# Patient Record
Sex: FEMALE | Race: WHITE | NOT HISPANIC OR LATINO | Employment: OTHER | URBAN - METROPOLITAN AREA
[De-identification: names, ages, dates, MRNs, and addresses within clinical notes are randomized per-mention and may not be internally consistent; named-entity substitution may affect disease eponyms.]

---

## 2018-02-26 ENCOUNTER — RECORDS - HEALTHEAST (OUTPATIENT)
Dept: LAB | Facility: CLINIC | Age: 68
End: 2018-02-26

## 2018-02-28 LAB — BACTERIA SPEC CULT: NORMAL

## 2018-12-05 ENCOUNTER — RECORDS - HEALTHEAST (OUTPATIENT)
Dept: LAB | Facility: CLINIC | Age: 68
End: 2018-12-05

## 2018-12-05 LAB
CHOLEST SERPL-MCNC: 211 MG/DL
FASTING STATUS PATIENT QL REPORTED: ABNORMAL
HDLC SERPL-MCNC: 65 MG/DL
LDLC SERPL CALC-MCNC: 130 MG/DL
TRIGL SERPL-MCNC: 82 MG/DL
TSH SERPL DL<=0.005 MIU/L-ACNC: 1.74 UIU/ML (ref 0.3–5)

## 2018-12-06 LAB — 25(OH)D3 SERPL-MCNC: 28.3 NG/ML (ref 30–80)

## 2018-12-18 ASSESSMENT — MIFFLIN-ST. JEOR
SCORE: 1175.86
SCORE: 1175.86

## 2018-12-19 ENCOUNTER — ANESTHESIA - HEALTHEAST (OUTPATIENT)
Dept: SURGERY | Facility: AMBULATORY SURGERY CENTER | Age: 68
End: 2018-12-19

## 2018-12-20 ENCOUNTER — HOSPITAL ENCOUNTER (OUTPATIENT)
Dept: SURGERY | Facility: AMBULATORY SURGERY CENTER | Age: 68
Discharge: HOME | End: 2018-12-20
Attending: COLON & RECTAL SURGERY | Admitting: COLON & RECTAL SURGERY

## 2018-12-20 ENCOUNTER — SURGERY - HEALTHEAST (OUTPATIENT)
Dept: SURGERY | Facility: AMBULATORY SURGERY CENTER | Age: 68
End: 2018-12-20

## 2018-12-20 DIAGNOSIS — K62.89 MASS OF ANUS: ICD-10-CM

## 2018-12-20 ASSESSMENT — MIFFLIN-ST. JEOR
SCORE: 1175.86
SCORE: 1175.86

## 2019-03-06 ENCOUNTER — AMBULATORY - HEALTHEAST (OUTPATIENT)
Dept: OTHER | Facility: CLINIC | Age: 69
End: 2019-03-06

## 2019-10-07 ENCOUNTER — COMMUNICATION - HEALTHEAST (OUTPATIENT)
Dept: UROLOGY | Facility: CLINIC | Age: 69
End: 2019-10-07

## 2019-10-08 ENCOUNTER — OFFICE VISIT - HEALTHEAST (OUTPATIENT)
Dept: UROLOGY | Facility: CLINIC | Age: 69
End: 2019-10-08

## 2019-10-08 DIAGNOSIS — N20.1 CALCULUS OF URETER: ICD-10-CM

## 2019-10-08 DIAGNOSIS — N20.0 CALCULUS OF KIDNEY: ICD-10-CM

## 2019-10-11 ENCOUNTER — ANESTHESIA - HEALTHEAST (OUTPATIENT)
Dept: SURGERY | Facility: CLINIC | Age: 69
End: 2019-10-11

## 2019-10-11 ENCOUNTER — SURGERY - HEALTHEAST (OUTPATIENT)
Dept: SURGERY | Facility: CLINIC | Age: 69
End: 2019-10-11

## 2019-10-11 ASSESSMENT — MIFFLIN-ST. JEOR: SCORE: 1135.49

## 2019-10-17 ENCOUNTER — AMBULATORY - HEALTHEAST (OUTPATIENT)
Dept: UROLOGY | Facility: CLINIC | Age: 69
End: 2019-10-17

## 2019-10-17 DIAGNOSIS — N20.0 CALCULUS OF KIDNEY: ICD-10-CM

## 2019-10-17 DIAGNOSIS — N20.1 CALCULUS OF URETER: ICD-10-CM

## 2019-10-17 LAB
ALBUMIN UR-MCNC: ABNORMAL MG/DL
APPEARANCE UR: ABNORMAL
BILIRUB UR QL STRIP: NEGATIVE
COLOR UR AUTO: YELLOW
GLUCOSE UR STRIP-MCNC: NEGATIVE MG/DL
HGB UR QL STRIP: ABNORMAL
KETONES UR STRIP-MCNC: NEGATIVE MG/DL
LEUKOCYTE ESTERASE UR QL STRIP: ABNORMAL
NITRATE UR QL: NEGATIVE
PH UR STRIP: 5.5 [PH] (ref 5–8)
SP GR UR STRIP: >=1.03 (ref 1–1.03)
UROBILINOGEN UR STRIP-ACNC: ABNORMAL

## 2019-10-20 LAB — BACTERIA SPEC CULT: ABNORMAL

## 2019-11-19 ENCOUNTER — AMBULATORY - HEALTHEAST (OUTPATIENT)
Dept: LAB | Facility: CLINIC | Age: 69
End: 2019-11-19

## 2019-11-19 ENCOUNTER — OFFICE VISIT - HEALTHEAST (OUTPATIENT)
Dept: UROLOGY | Facility: CLINIC | Age: 69
End: 2019-11-19

## 2019-11-19 ENCOUNTER — HOSPITAL ENCOUNTER (OUTPATIENT)
Dept: CT IMAGING | Facility: CLINIC | Age: 69
Discharge: HOME | End: 2019-11-19
Attending: UROLOGY

## 2019-11-19 DIAGNOSIS — N20.1 CALCULUS OF URETER: ICD-10-CM

## 2019-11-19 DIAGNOSIS — N20.0 CALCULUS OF KIDNEY: ICD-10-CM

## 2019-11-19 LAB
ALBUMIN UR-MCNC: NEGATIVE MG/DL
APPEARANCE UR: ABNORMAL
BILIRUB UR QL STRIP: NEGATIVE
CALCIUM SERPL-MCNC: 10.3 MG/DL (ref 8.5–10.5)
CALCIUM, IONIZED MEASURED: 1.37 MMOL/L (ref 1.11–1.3)
COLOR UR AUTO: YELLOW
CREAT SERPL-MCNC: 0.71 MG/DL (ref 0.6–1.1)
GFR SERPL CREATININE-BSD FRML MDRD: >60 ML/MIN/1.73M2
GLUCOSE UR STRIP-MCNC: NEGATIVE MG/DL
HGB UR QL STRIP: ABNORMAL
ION CA PH 7.4: 1.35 MMOL/L (ref 1.11–1.3)
KETONES UR STRIP-MCNC: NEGATIVE MG/DL
LEUKOCYTE ESTERASE UR QL STRIP: ABNORMAL
NITRATE UR QL: NEGATIVE
PH UR STRIP: 6.5 [PH] (ref 5–8)
PH: 7.37 (ref 7.35–7.45)
PHOSPHATE SERPL-MCNC: 3.3 MG/DL (ref 2.5–4.5)
PTH-INTACT SERPL-MCNC: 185 PG/ML (ref 10–86)
SP GR UR STRIP: 1.02 (ref 1–1.03)
UROBILINOGEN UR STRIP-ACNC: ABNORMAL

## 2019-11-21 ENCOUNTER — AMBULATORY - HEALTHEAST (OUTPATIENT)
Dept: LAB | Facility: HOSPITAL | Age: 69
End: 2019-11-21

## 2019-11-21 DIAGNOSIS — N20.0 CALCULUS OF KIDNEY: ICD-10-CM

## 2019-11-21 LAB
BACTERIA SPEC CULT: ABNORMAL
MAGNESIUM 24H UR-MRATE: 104 MG/24 HR (ref 75–150)
MAGNESIUM UR-MCNC: 6.4 MG/DL
SPECIMEN VOL UR: 1625 ML

## 2019-11-22 LAB
CALCIUM 24H UR-MRATE: 358 MG/24HR (ref 20–275)
CHLORIDE 24H UR-SRATE: 133 MMOL/24HR (ref 110–250)
CITRATE 24H UR-MCNC: 613 MG/24HR
CREATININE, 24 HR URINE - HISTORICAL: 957.1 MG/24HR
OXALATE MG/SPEC: 33.8 MG/24HR (ref 7–44)
PH UR STRIP: 6 [PH] (ref 4.5–8)
PHOSPHORUS URINE MG/SPEC: 919.8 MG/24HR
POTASSIUM 24H UR-SCNC: 57 MMOL/24HR (ref 30–90)
SODIUM 24H UR-SRATE: 132 MMOL/24HR (ref 40–217)
SPECIMEN VOL UR: 1625 ML
URIC ACID URINE MG/SPEC: 575 MG/24HR (ref 250–750)

## 2019-11-29 ENCOUNTER — AMBULATORY - HEALTHEAST (OUTPATIENT)
Dept: LAB | Facility: HOSPITAL | Age: 69
End: 2019-11-29

## 2019-11-29 DIAGNOSIS — N20.0 CALCULUS OF KIDNEY: ICD-10-CM

## 2019-11-29 LAB
CALCIUM 24H UR-MRATE: 314 MG/24HR (ref 20–275)
CHLORIDE 24H UR-SRATE: 135 MMOL/24HR (ref 110–250)
CITRATE 24H UR-MCNC: 622 MG/24HR
CREATININE, 24 HR URINE - HISTORICAL: 930 MG/24HR
MAGNESIUM 24H UR-MRATE: 101 MG/24 HR (ref 75–150)
MAGNESIUM UR-MCNC: 6.7 MG/DL
OXALATE MG/SPEC: 33.8 MG/24HR (ref 7–44)
PH UR STRIP: 6.5 [PH] (ref 4.5–8)
PHOSPHORUS URINE MG/SPEC: 907.5 MG/24HR
POTASSIUM 24H UR-SCNC: 47 MMOL/24HR (ref 30–90)
SODIUM 24H UR-SRATE: 153 MMOL/24HR (ref 40–217)
SPECIMEN VOL UR: 1500 ML
SPECIMEN VOL UR: 1500 ML
URIC ACID URINE MG/SPEC: 473 MG/24HR (ref 250–750)

## 2019-12-05 ENCOUNTER — COMMUNICATION - HEALTHEAST (OUTPATIENT)
Dept: UROLOGY | Facility: CLINIC | Age: 69
End: 2019-12-05

## 2019-12-13 ENCOUNTER — RECORDS - HEALTHEAST (OUTPATIENT)
Dept: LAB | Facility: CLINIC | Age: 69
End: 2019-12-13

## 2019-12-13 LAB — TSH SERPL DL<=0.005 MIU/L-ACNC: 1.71 UIU/ML (ref 0.3–5)

## 2019-12-16 ENCOUNTER — COMMUNICATION - HEALTHEAST (OUTPATIENT)
Dept: UROLOGY | Facility: CLINIC | Age: 69
End: 2019-12-16

## 2019-12-16 ENCOUNTER — OFFICE VISIT - HEALTHEAST (OUTPATIENT)
Dept: UROLOGY | Facility: CLINIC | Age: 69
End: 2019-12-16

## 2019-12-16 DIAGNOSIS — N20.0 CALCULUS OF KIDNEY: ICD-10-CM

## 2019-12-16 LAB — 25(OH)D3 SERPL-MCNC: 20.8 NG/ML (ref 30–80)

## 2020-06-05 ENCOUNTER — RECORDS - HEALTHEAST (OUTPATIENT)
Dept: UROLOGY | Facility: CLINIC | Age: 70
End: 2020-06-05

## 2020-06-05 ENCOUNTER — AMBULATORY - HEALTHEAST (OUTPATIENT)
Dept: UROLOGY | Facility: CLINIC | Age: 70
End: 2020-06-05

## 2020-06-05 DIAGNOSIS — N20.0 CALCULUS OF KIDNEY: ICD-10-CM

## 2020-06-22 ENCOUNTER — RECORDS - HEALTHEAST (OUTPATIENT)
Dept: RADIOLOGY | Facility: CLINIC | Age: 70
End: 2020-06-22

## 2020-06-22 ENCOUNTER — OFFICE VISIT - HEALTHEAST (OUTPATIENT)
Dept: UROLOGY | Facility: CLINIC | Age: 70
End: 2020-06-22

## 2020-06-22 DIAGNOSIS — N20.0 CALCULUS OF KIDNEY: ICD-10-CM

## 2020-06-22 RX ORDER — AZELASTINE 1 MG/ML
1 SPRAY, METERED NASAL DAILY PRN
Status: SHIPPED | COMMUNITY
Start: 2019-11-18

## 2021-01-08 ENCOUNTER — RECORDS - HEALTHEAST (OUTPATIENT)
Dept: LAB | Facility: CLINIC | Age: 71
End: 2021-01-08

## 2021-01-11 LAB — 25(OH)D3 SERPL-MCNC: 37 NG/ML (ref 30–80)

## 2021-02-17 ENCOUNTER — AMBULATORY - HEALTHEAST (OUTPATIENT)
Dept: NURSING | Facility: CLINIC | Age: 71
End: 2021-02-17

## 2021-03-10 ENCOUNTER — AMBULATORY - HEALTHEAST (OUTPATIENT)
Dept: NURSING | Facility: CLINIC | Age: 71
End: 2021-03-10

## 2021-05-26 VITALS — SYSTOLIC BLOOD PRESSURE: 138 MMHG | TEMPERATURE: 97.7 F | HEART RATE: 71 BPM | DIASTOLIC BLOOD PRESSURE: 65 MMHG

## 2021-05-26 VITALS — DIASTOLIC BLOOD PRESSURE: 65 MMHG | HEART RATE: 65 BPM | TEMPERATURE: 97.5 F | SYSTOLIC BLOOD PRESSURE: 138 MMHG

## 2021-05-26 VITALS — HEART RATE: 79 BPM | DIASTOLIC BLOOD PRESSURE: 65 MMHG | SYSTOLIC BLOOD PRESSURE: 149 MMHG | TEMPERATURE: 97.5 F

## 2021-05-26 VITALS — SYSTOLIC BLOOD PRESSURE: 143 MMHG | DIASTOLIC BLOOD PRESSURE: 67 MMHG | HEART RATE: 91 BPM | TEMPERATURE: 98.4 F

## 2021-06-02 VITALS
WEIGHT: 142 LBS | HEIGHT: 66 IN | HEIGHT: 66 IN | WEIGHT: 142 LBS | BODY MASS INDEX: 22.82 KG/M2 | BODY MASS INDEX: 22.82 KG/M2

## 2021-06-02 NOTE — PROGRESS NOTES
Assessment/Plan:        Diagnoses and all orders for this visit:    Calculus of ureter  -     Urinalysis Macroscopic  -     Culture, Urine- Future; Future; Expected date: 11/16/2019  -     Culture, Urine- Future  -     ciprofloxacin HCl tablet 250 mg (CIPRO)  -     Cystoscopy with Stent Removal Education  -     Patient Stated Goal: Prevent further stones  -     CT Abdomen Pelvis Without Oral Without IV Contrast; Future; Expected date: 11/16/2019    Calculus of kidney  -     CT Abdomen Pelvis Without Oral Without IV Contrast; Future; Expected date: 11/16/2019    Other orders  -     acetaminophen (TYLENOL) 500 MG tablet; Take 500 mg by mouth every 6 (six) hours as needed for pain.  -     ibuprofen (ADVIL) 200 MG tablet; Take 200 mg by mouth every 6 (six) hours as needed for pain.  -     HEMP OIL OR EXTRACT OR OTHER CBD CANNABINOID, NOT MEDICAL CANNABIS,  -     ciprofloxacin HCl (CIPRO) 250 MG tablet      Stone Management Plan  [facility] Stone Management 10/8/2019 10/17/2019   Urinary Tract Infection No suspicion of infection No suspicion of infection   Renal Colic Well controlled symptoms Well controlled symptoms   Renal Failure No suspicion of renal failure No suspicion of renal failure   Current CT date 10/6/2019 -   Right sided stones? Yes -   R Number of ureteral stones 1 -   R GSD of ureteral stones 7 -   R Location of ureteral stone Proximal -   R Number of kidney stones  No renal stones -   R GSD of kidney stones N/A -   R Hydronephrosis Mild -   R Stone Event New event Established event   Diagnosis date 10/6/2019 -   Initial location of primary symptomatic stone Proximal -   Initial GSD of primary symptomatic stone 7 -   R Post-op status - Stent Removal   R Current Plan Clear -   Clear rationale Patient preference -   Left sided stones? Yes -   L Number of ureteral stones No ureteral stones -   L Number of kidney stones  1 -   L GSD of kidney stones 15 - 20 -   L Hydronephrosis None -   L Stone Event No current  event No current event   L Current Plan Observe -   Observe rationale Significant stone burden amenable to emergency management -             Subjective:      HPI  Ms. Rosmery Heard is a 69 y.o.  female returning to the [facility] for early postoperative follow up for anticipated stent removal.     She returns status post right ureteroscopic extraction for proximal ureteral stone. She has had no unanticipated post-operative events.    She has had no symptoms suspicious for infection and stent was moderately symptomatic with typical issues of flank discomfort and lower urinary tract irritation.     Flexible cystoscopy is performed and indwelling stent is removed without incident.    She will follow up in the office in one month with imaging.       ROS   Review of systems is negative except for HPI.    Past Medical History:   Diagnosis Date     Cancer (H)     anal     Kidney stone        Past Surgical History:   Procedure Laterality Date     CYSTOSCOPY W/ URETERAL STENT PLACEMENT Right 10/11/2019    Procedure: RIGHT CYSTOURETEROSCOPY WITH BASKET STONE EXTRACTION  AND URETERAL STENT INSERTION;  Surgeon: Aayush Rider MD;  Location: [facility];  Service: Urology     DILATION AND CURETTAGE OF UTERUS       VULVA / PERINEUM BIOPSY N/A 12/20/2018    Procedure: EUA BIOPSY ANAL MASS;  Surgeon: Blaire Ashby MD;  Location: [facility];  Service: General       Current Outpatient Medications   Medication Sig Dispense Refill     acetaminophen (TYLENOL) 500 MG tablet Take 500 mg by mouth every 6 (six) hours as needed for pain.       HEMP OIL OR EXTRACT OR OTHER CBD CANNABINOID, NOT MEDICAL CANNABIS,        ibuprofen (ADVIL) 200 MG tablet Take 200 mg by mouth every 6 (six) hours as needed for pain.       Current Facility-Administered Medications   Medication Dose Route Frequency Provider Last Rate Last Dose     ciprofloxacin HCl (CIPRO) 250 MG tablet                Allergies    Allergen Reactions     Other Environmental Allergy      Seasonal allergies       Social History     Socioeconomic History     Marital status:      Spouse name: Not on file     Number of children: Not on file     Years of education: Not on file     Highest education level: Not on file   Occupational History     Occupation: Works Independently   Social Needs     Financial resource strain: Not on file     Food insecurity:     Worry: Not on file     Inability: Not on file     Transportation needs:     Medical: Not on file     Non-medical: Not on file   Tobacco Use     Smoking status: Never Smoker     Smokeless tobacco: Never Used   Substance and Sexual Activity     Alcohol use: Yes     Frequency: Never     Comment: very rare     Drug use: No     Sexual activity: Not on file   Lifestyle     Physical activity:     Days per week: Not on file     Minutes per session: Not on file     Stress: Not on file   Relationships     Social connections:     Talks on phone: Not on file     Gets together: Not on file     Attends Tenri[employer] service: Not on file     Active member of club or organization: Not on file     Attends meetings of clubs or organizations: Not on file     Relationship status: Not on file     Intimate partner violence:     Fear of current or ex partner: Not on file     Emotionally abused: Not on file     Physically abused: Not on file     Forced sexual activity: Not on file   Other Topics Concern     Not on file   Social History Narrative     Not on file       Family History   Problem Relation Age of Onset     Urolithiasis Father      Heart disease Father         mi     Diabetes Sister      Urolithiasis Cousin      Cancer Neg Hx      Gout Neg Hx      Clotting disorder Neg Hx      Objective:      Physical Exam  Vitals:    10/17/19 1307   BP: 143/67   Pulse: 91   Temp: 98.4  F (36.9  C)     General - well developed, well nourished, appropriate for age. Appears no distress at this time  Abdomen - mildly obese  soft, non-tender, no hepatosplenomegaly, no masses.   - no flank tenderness, no suprapubic tenderness, kidney and bladder non-palpable  MSK - normal spinal curvature. no spinal tenderness. normal gait. muscular strength intact.  Psych - oriented to time, place, and person, normal mood and affect.      Labs   Urinalysis POC (Office):  Nitrite, UA   Date Value Ref Range Status   10/17/2019 Negative Negative Final   10/06/2019 Negative Negative Final   02/23/2019 Negative Negative Final       Lab Urinalysis:  Blood, UA   Date Value Ref Range Status   10/17/2019 Large (!) Negative Final   10/06/2019 Large (!) Negative Final   02/23/2019 Small (!) Negative Final     Nitrite, UA   Date Value Ref Range Status   10/17/2019 Negative Negative Final   10/06/2019 Negative Negative Final   02/23/2019 Negative Negative Final     Leukocytes, UA   Date Value Ref Range Status   10/17/2019 Small (!) Negative Final   10/06/2019 Small (!) Negative Final   02/23/2019 Moderate (!) Negative Final     pH, UA   Date Value Ref Range Status   10/17/2019 5.5 5.0 - 8.0 Final   10/06/2019 5.5 4.5 - 8.0 Final   02/23/2019 7.5 4.5 - 8.0 Final

## 2021-06-02 NOTE — ANESTHESIA CARE TRANSFER NOTE
Last vitals:   Vitals:    10/11/19 1109   BP: 113/55   Pulse: 62   Resp: 14   Temp: 37.1  C (98.7  F)   SpO2: 100%     Patient's level of consciousness is drowsy  Spontaneous respirations: yes  Maintains airway independently: yes  Dentition unchanged: yes  Oropharynx: oropharynx clear of all foreign objects    QCDR Measures:  ASA# 20 - Surgical Safety Checklist: WHO surgical safety checklist completed prior to induction    PQRS# 430 - Adult PONV Prevention: 4558F - Pt received => 2 anti-emetic agents (different classes) preop & intraop  ASA# 8 - Peds PONV Prevention: NA - Not pediatric patient, not GA or 2 or more risk factors NOT present  PQRS# 424 - [place]-op Temp Management: 4559F - At least one body temp DOCUMENTED => 35.5C or 95.9F within required timeframe  PQRS# 426 - PACU Transfer Protocol: - Transfer of care checklist used  ASA# 14 - Acute Post-op Pain: ASA14B - Patient did NOT experience pain >= 7 out of 10

## 2021-06-02 NOTE — TELEPHONE ENCOUNTER
Message left for patient to call clinic to set up an appointment for kidney stone follow-up.  Melly Mistry RN

## 2021-06-02 NOTE — PATIENT INSTRUCTIONS - HE
Patient Stated Goal: Know what to expect after surgery  Ureteroscopy    Ureteroscopy is a procedure which is done for clearance of stones from the ureter, kidney or both. There are no incisions involved. The procedure involves your surgeon placing a small scope into your urethra. This is the opening where urine leaves your body.  The surgeon watches as they carefully guide the scope to the stone(s).  Modern flexible ureteroscopes can be used to reach virtually any location within the urinary tract.     The size, shape and location of the stone determines how best to treat the stone(s).  Whenever possible, stones are removed in one piece.  Larger stones need to be broken using a laser before removing in smaller pieces.  The goal is to remove all stones and stone fragments from that side of the body in a single treatment.  Complete stone clearance is an important step to prevent future kidney stone episodes.    Surgery:    Same day outpatient procedure    30-60 minutes    Procedure done in hospital surgical suite    General anesthesia (you will be asleep during the procedure)     Antibiotic prior to surgery to prevent infection    Physician will visit with you and respond to any questions or concerns and consent will be signed prior to going to the operating room    Risks:    Infection - Preoperative antibiotics should prevent new infections but it is possible that unanticipated bacteria may be introduced at time of surgery or that the stones were actually chronically infected before surgery      Injury - The ureter may be injured during this procedure.  This is most likely to happen if the ureter was very inflamed before surgery or if a stone is very tightly impacted.  The surgeon will not aggressively treat a stone if this creates a risk of injury.        Inaccessible Stones -A single procedure is effective in 95% of cases, but if your ureter is very narrow or your kidney stone is very impacted, a stent will be  placed and the procedure stopped.  In 1-2 weeks after the ureter has relaxed, the patient will be brought back to surgery and the procedure can be safely performed.      Incomplete stone clearance -Occasionally stone or stone fragments may not be completely cleared.  These may pass on their own, which may cause discomfort.  Our goal is to remove all possible stones and fragments.    Stent:      An internal soft tube will be placed between the kidney and the bladder while in surgery (after the stone is cleared). The stent will keep the kidney draining.    What should I expect?     It is common for a stent to cause some irritation and discomfort.   You may have:      The need to urinate suddenly     The need to urinate often     Pain during urination     A dull backache, which may get worse during urination     Blood stained urine (like fruit punch) and occasional small clots    It s important to remember the stent is necessary and only temporary. To feel more comfortable:      Drink more than you normally would but you do not have to constantly  flush your kidneys     Limiting your activity may decrease irritation or bleeding    Ibuprofen - 2 tablets every 6-8 hours     Use pain medications as directed.    When is the stent removed?    Most stents are removed within 5 days to 2 weeks after a procedure.     How is the stent removed?     Your stent will be removed in the [facility] with a small telescope and a grasping tool.  It usually takes less than 1 minute to remove the stent.    What should I expect after the stent is removed?     You should feel normal by the next day    Some patients find:    An increase in back pain about an hour after the stent is removed as the kidney fills up with urine before it starts to empty.  It can be as uncomfortable as your initial stone episode.  Taking pain medications before stent removal may be helpful, but you would need someone else to drive you to and from your  appointment.    Bladder symptoms usually disappear by the next morning.    Small amounts of blood in the urine may be seen occasionally for up to a week.    Diet:      After surgery, there are no dietary restrictions - Drink to thirst, there is no need to increase intake of fluids, as this may increase nausea symptoms. Try to eat smaller, more frequent snacks, instead of large meals.    Activity:    Many people return to work within 1-2 days. Fatigue is normal for a couple of weeks following surgery. With increased activity you may experience more discomfort and you may notice more blood in your urine.      Post-Operative Symptom Control    While you recover from your procedure, you can take steps to ease your recovery.    Medications that prevent further episodes of severe pain and help stones pass: Take these as prescribed on a regular basis even if you are NOT in pain      Ibuprofen (Advil or Motrin) - Is available over the counter Take 2 (200mg) tablets every 6 hours until the stone passes.  o prevents spasm of the ureter.    o Decreases pain      Dramamine - (drowsy version, non-generic formulation) Is available over the counter and decreases spasm of the ureter.  Take 50mg at bedtime every night until the stone passes. In addition, take every 6 hours as needed.  Dramamine:  o Decreases nausea  o Decreases acute pain  o Decreases recurrence of pain for next 24 hours  o Will help you sleep        *This medication will cause increased drowsiness, do not drive or operate machinery for 6 hours      Flomax- Studies show that Flomax decreases irritation from stents.   o Take every day with food until stone passes even if you do not have pain  o Flomax does not relieve pain.        *This medication may cause nasal congestion or light-headedness      Detrol ( Tolterodine) - After surgery Detrol may decrease stent irritation and pelvic pain  o Take as prescribed     *This medication may cause dry mouth, constipation or  blurry vision. Stop medication if unable to urinate.    Medication that are taken as needed to manage break through symptoms: Take these ONLY as required and hopefully not at all      Narcotics (Percocet, Vicodin, Dilaudid)- take as prescribed for severe pain unrelieved by ibuprofen and dramamine  o Take as prescribed for severe pain  o Narcotics have significant side effects and only  cover-up  pain. They have no effect on cause of pain.  o Common side effects:  - Confusion, disorientation and sedation - DO NOT DRIVE OR OPERATE MACHINERY WITHIN 24 HOURS  - Nausea - take Dramamine or Zofran  or Haldol to help control  - Constipation  - Sleep disturbances      Ondansetron (Zofran)-  o Take as prescribed  o Reserve for severe nausea  o May cause constipation, start over the counter Miralax if needed to treat this    Haldol-  o Take as prescribed  o Reserve for severe nausea    Warning Signs/Symptoms - Please call the [facility] 24 hours a day at [phone] IMMEDIATELY if you experience any of these:    Fever greater than 100.1     Chills    Pain NOT CONTROLLED by pain medications    Heavy bleeding or large clots in urine (small clots can be normal)    Persistent nausea and/or vomiting    Post-Operative Follow up:    The stone(s) will be sent from surgery to a lab for composition analysis.  These results are usually available before a one month post-operative visit.  If you had laser treatment to break up your stone, you will usually be scheduled for a low dose CT scan prior to your one month appointment.  This scan allows your surgeon to confirm that all stone fragments were cleared at time of surgery and that there have been no complications.  These results along with possible labs and urine studies will help us develop an individualized plan to prevent new stones from forming and keep existing stones from enlarging.  This visit is usually scheduled about 1 month after your original surgery.    The  [facility] can respond to your questions or concerns 24 hours a day at [phone].

## 2021-06-02 NOTE — PATIENT INSTRUCTIONS - HE
Patient Stated Goal: Prevent further stones  Cystoscopy with Stent Removal    Cystoscopy is used to help diagnose urinary problems, or to remove a ureteral stent.    During a cystoscopy, your doctor examines the inside of your bladder with an instrument called a cystoscope. A cystoscope is a long, thin flexible tube with a camera at the end.    Your doctor will insert the scope into your urethra allowing him to visualize and evaluate the inside of the bladder for possible abnormalities. The urethra is the tube that carries urine to the outside of your body.    How is the stent removed?    Your stent will be removed in the [facility] with a small telescope and a grasping tool.  It usually takes less than 1 minute to remove the stent.    What should I expect after the stent is removed?     You should feel normal by the next day.    Some patients find:      An increase in back pain about an hour after the stent is removed as the kidney fills up with urine before it starts to empty.  It can be as uncomfortable as your initial stone episode.  Taking pain medications before stent removal may be helpful, but you would need someone else to drive you to and from your appointment.    Bladder symptoms usually disappear by the next morning.    Small amounts of blood in the urine may be seen occasionally for up to a week.    At Home:      It is important to drink plenty of fluids after your procedure    You may continue to use your pain medications as prescribed    What symptoms should I watch for?    Fever     Chills    Increasing back pain that is not relieved with pain medications    Large amounts of blood in the urine or large clots    Leakage of urine (incontinence)     Are not able to urinate for 8 hours    These symptoms may mean you have a blockage or infection. Call the [facility] 24 hours a day at [phone] immediately.

## 2021-06-02 NOTE — PROGRESS NOTES
Assessment/Plan:        Diagnoses and all orders for this visit:    Calculus of ureter  -     Urinalysis Macroscopic  -     tamsulosin (FLOMAX) 0.4 mg cap; Take 1 capsule (0.4 mg total) by mouth daily for 14 days.  Dispense: 14 capsule; Refill: 1  -     Patient Stated Goal: Know what to expect after surgery  -     Ureteroscopy Education  -     Verify informed consent; Standing  -     Diet NPO; Standing  -     Place sequential compression device; Standing  -     XR Retrograde Pyelogram W or WO KUB Intraoperative; Standing  -     levoFLOXacin 500 mg/100 mL IVPB 500 mg (LEVAQUIN)  -     ketorolac injection 15 mg (TORADOL)  -     acetaminophen tablet 1,000 mg (TYLENOL)  -     sterile water IR irrigation solution 3,000 mL  -     Case Request: RIGHT CYSTOURETEROSCOPY WITH LITHOTRIPSY USING HOLMIUM LASER AND URETERAL STENT INSERTION; Standing  -     Case Request: RIGHT CYSTOURETEROSCOPY WITH LITHOTRIPSY USING HOLMIUM LASER AND URETERAL STENT INSERTION    Calculus of kidney      Stone Management Plan  [facility] 10/8/2019   Urinary Tract Infection No suspicion of infection   Renal Colic Well controlled symptoms   Renal Failure No suspicion of renal failure   Current CT date 10/6/2019   Right sided stones? Yes   R Number of ureteral stones 1   R GSD of ureteral stones 7   R Location of ureteral stone Proximal   R Number of kidney stones  No renal stones   R GSD of kidney stones N/A   R Hydronephrosis Mild   R Stone Event New event   Diagnosis date 10/6/2019   Initial location of primary symptomatic stone Proximal   Initial GSD of primary symptomatic stone 7   R Current Plan Clear   Clear rationale Patient preference   Left sided stones? Yes   L Number of ureteral stones No ureteral stones   L Number of kidney stones  1   L GSD of kidney stones 15 - 20   L Hydronephrosis None   L Stone Event No current event   L Current Plan Observe   Observe rationale Significant stone burden amenable to emergency management                Subjective:      HPI  Ms. Rosmery Heard is a 69 y.o.  female presenting to the [facility] [place] following [facility]'s ER visit for urolithiasis.    She is a first time unidentified composition stone former who has not required stone clearance procedures. She has not previously participated in stone risk evaluation. She has no identified modifiable stone risk factors. She has no identified non-modifiable stone risk factors.    She was seen in ER 10/6/19 for acute onset right flank pain x 3 days. The pain was sharp and fluctuant without radiation. She has hx of kidney stones in remote past with similar pain. She took advil with some relief. She had associated nausea. Workup was notable for CT reporting an obstructing right ureteral stone and conglomeration of left renal stones. UA was not suspicious for infection. She was sent home with zofran and oxycodone.       Significant current symptoms include:  Mild right flank pain. Pertinent negative current symptoms include:  fever, chills, left flank pain, nausea, vomiting, hematuria, urinary frequency and dysuria. Of note, she has hx of anal cancer s/p chemo/radiation this last Winter. She had a single UTI earlier this year.    CT scan from 10/6/19 is personally reviewed and demonstrates a mildly obstructing 6 x 3 mm right proximal ureteral stone. There is a branching stone within left lower pole measuring 17 mm.    Significant labs from presentation include severe hematuria, mild pyuria, negative nitrite, no bacteria and no growth on urine culture.    PLAN    70 yo F with hx of stone disease, no previous surgical interventions. Obstructing right ureteral stone, pain currently controlled. Dominant, nonobstructing left renal stone.    Rosmery would prefer to have the right stone surgical managed given persistent pain. Will proceed with ureterscopic stone clearance this week. Risks and benefits were detailed of ureteroscopic stone  clearance including potential issues of urinary or systemic infection, ureteral injury, inaccessible stone, incomplete stone clearance, multiple surgeries, and stent related symptoms of urgency, frequency and hematuria Patient verbalized understanding. Patient agrees with plan as discussed. Preoperative evaluation with primary care has not been requested because of urgency of situation.    For symptom control, she was prescribed oxycodone, ondansetron and Flomax. Over the counter symptom control medications of ibuprofen, Dramamine and tylenol were recommended.    Patient also seen and examined by LINA Murray   Review of Systems  A 12 point comprehensive review of systems is negative except for HPI    Past Medical History:   Diagnosis Date     Cancer (H)     anal     Kidney stone        Past Surgical History:   Procedure Laterality Date     DILATION AND CURETTAGE OF UTERUS       VULVA / PERINEUM BIOPSY N/A 12/20/2018    Procedure: EUA BIOPSY ANAL MASS;  Surgeon: Blaire Ashby MD;  Location: [facility];  Service: General       Current Outpatient Medications   Medication Sig Dispense Refill     acetaminophen (TYLENOL) 325 MG tablet Take 2 tablets (650 mg total) by mouth every 4 (four) hours as needed.  0     acetaminophen (TYLENOL) 500 MG tablet Take 2 tablets (1,000 mg total) by mouth 4 (four) times a day for 7 days. 56 tablet 0     dimenhyDRINATE (DRAMAMINE) 50 MG tablet Take 1 tablet (50 mg total) by mouth at bedtime for 7 days. 7 tablet 0     dimenhyDRINATE (DRAMAMINE) 50 MG tablet Take 1 tablet (50 mg total) by mouth 4 (four) times a day as needed. 28 tablet 0     ibuprofen (ADVIL,MOTRIN) 200 MG tablet Take 2 tablets (400 mg total) by mouth 4 (four) times a day for 7 days. 56 tablet 0     oxyCODONE (ROXICODONE) 5 MG immediate release tablet Every 4-6 hours as needed if pain is not improved with acetaminophen and ibuprofen. 8 tablet 0     LORazepam (ATIVAN) 0.5 MG tablet Take 0.5 mg by mouth  every 6 (six) hours as needed (nausea).       ondansetron (ZOFRAN ODT) 4 MG disintegrating tablet Take 1 tablet (4 mg total) by mouth every 8 (eight) hours as needed for nausea. 12 tablet 0     tamsulosin (FLOMAX) 0.4 mg cap Take 1 capsule (0.4 mg total) by mouth daily for 14 days. 14 capsule 1     No current facility-administered medications for this visit.        No Known Allergies    Social History     Socioeconomic History     Marital status:      Spouse name: Not on file     Number of children: Not on file     Years of education: Not on file     Highest education level: Not on file   Occupational History     Occupation: Works Independently   Social Needs     Financial resource strain: Not on file     Food insecurity:     Worry: Not on file     Inability: Not on file     Transportation needs:     Medical: Not on file     Non-medical: Not on file   Tobacco Use     Smoking status: Never Smoker     Smokeless tobacco: Never Used   Substance and Sexual Activity     Alcohol use: Not Currently     Frequency: Never     Drug use: No     Sexual activity: Not on file   Lifestyle     Physical activity:     Days per week: Not on file     Minutes per session: Not on file     Stress: Not on file   Relationships     Social connections:     Talks on phone: Not on file     Gets together: Not on file     Attends [employer] service: Not on file     Active member of club or organization: Not on file     Attends meetings of clubs or organizations: Not on file     Relationship status: Not on file     Intimate partner violence:     Fear of current or ex partner: Not on file     Emotionally abused: Not on file     Physically abused: Not on file     Forced sexual activity: Not on file   Other Topics Concern     Not on file   Social History Narrative     Not on file       Family History   Problem Relation Age of Onset     Urolithiasis Father      Heart disease Father         mi     Diabetes Sister      Urolithiasis Cousin       Cancer Neg Hx      Gout Neg Hx      Clotting disorder Neg Hx        Objective:      Physical Exam  Vitals:    10/08/19 0804   BP: 138/65   Pulse: 65   Temp: 97.5  F (36.4  C)     General - well developed, well nourished, appropriate for age. Appears in no acute distress   Heart - regular rate and rhythm, no murmur  Respiratory - normal effort, clear to auscultation, good air entry without adventitious noises  Abdomen - slender soft, non-tender, no hepatosplenomegaly, no masses.   - right flank mild tenderness, no suprapubic tenderness, kidney and bladder non-palpable  MSK - normal spinal curvature. no spinal tenderness. normal gait. muscular strength intact.  Neurology - cranial nerves II-XII grossly intact, normal sensation, no unsteadiness  Skin - intact, no bruising, no gouty tophi  Psych - oriented to time, place, and person, normal mood and affect.    Labs  Urinalysis POC (Office):  Nitrite, UA   Date Value Ref Range Status   10/06/2019 Negative Negative Final   02/23/2019 Negative Negative Final       Lab Urinalysis:  Blood, UA   Date Value Ref Range Status   10/06/2019 Large (!) Negative Final   02/23/2019 Small (!) Negative Final     Nitrite, UA   Date Value Ref Range Status   10/06/2019 Negative Negative Final   02/23/2019 Negative Negative Final     Leukocytes, UA   Date Value Ref Range Status   10/06/2019 Small (!) Negative Final   02/23/2019 Moderate (!) Negative Final     pH, UA   Date Value Ref Range Status   10/06/2019 5.5 4.5 - 8.0 Final   02/23/2019 7.5 4.5 - 8.0 Final    and Acute Labs   Urine Culture    Culture   Date Value Ref Range Status   10/06/2019 No Growth  Final   02/23/2019 >100,000 col/ml Proteus mirabilis (!)  Final   02/26/2018 Usual Azul  Final

## 2021-06-02 NOTE — ANESTHESIA POSTPROCEDURE EVALUATION
Patient: Rosmery Heard  RIGHT CYSTOURETEROSCOPY WITH BASKET STONE EXTRACTION  AND URETERAL STENT INSERTION  Anesthesia type: general    Patient location: PACU  Last vitals:   Vitals Value Taken Time   /67 10/11/2019 12:30 PM   Temp 37.1  C (98.7  F) 10/11/2019 11:09 AM   Pulse 70 10/11/2019 12:34 PM   Resp 20 10/11/2019 11:57 AM   SpO2 97 % 10/11/2019 12:34 PM   Vitals shown include unvalidated device data.  Post vital signs: stable  Level of consciousness: awake, alert, oriented and responds to simple questions  Post-anesthesia pain: pain controlled  Post-anesthesia nausea and vomiting: no  Pulmonary: unassisted, return to baseline  Cardiovascular: stable and blood pressure at baseline  Hydration: adequate  Anesthetic events: no    QCDR Measures:  ASA# 11 - Roseline-op Cardiac Arrest: ASA11B - Patient did NOT experience unanticipated cardiac arrest  ASA# 12 - Roseline-op Mortality Rate: ASA12B - Patient did NOT die  ASA# 13 - PACU Re-Intubation Rate: ASA13B - Patient did NOT require a new airway mgmt  ASA# 10 - Composite Anes Safety: ASA10A - No serious adverse event    Additional Notes:

## 2021-06-03 VITALS — BODY MASS INDEX: 21.39 KG/M2 | WEIGHT: 133.1 LBS | HEIGHT: 66 IN

## 2021-06-03 NOTE — PATIENT INSTRUCTIONS - HE
Patient Stated Goal: Prevent further stones  Steps for collecting a 24 hour urine specimen    Please follow the directions carefully. All urine voided for a 24-hour period needs to be collected into the jug.  DO NOT change any of your  normal  daily habits when doing this test. Continue to follow your regular diet, intake of fluids, and usual activity level. Pick the most convenient day with your schedule, perhaps on a weekend or a day off.    Start your Diet Log the day before collection and continue on the day of urine collection.  You MUST bring Diet Log with you on follow up visit to discuss results.    One 24hr Urine Collection     Two 24hr Urine Collections  (do not collect on consecutive days)    PLEASE COMPLETE THE 2nd JUG WITHIN 1-2 WEEKS FROM THE 1st JUG    STEP 1  Empty your bladder completely into the toilet. This will be your start time. Write your full legal name, start date and time on the jug label.  Collection start and stop times need to match exactly!  For example:  6 am to 6 am.    STEP 2  The next time you urinate, empty your bladder directly into the jug or collection hat and pour urine into the jug.  Screw the lid back onto the jug.  Do not spill!    STEP 3  Place the jug in the refrigerator or a cooler with ice during the collection period.  Failure to keep it cool could cause inaccurate test results. DO NOT Freeze.    STEP 4  Continue collecting all urine into the jug for the rest of the day, for the full 24 hours.  DO NOT stop early or go over 24 hours!    STEP 5  Exactly 24 hours from start of collection, write your full legal name, stop date and time on the jug label.   Collection start and stop times need to match exactly!  For example:  6 am to 6 am.  Failure to label correctly will result in recollection of urine specimen.    STEP 6  Return each jug within 24 hours after final urination.     STEP 7  Drop off jug locations:   [facility]: Mon-Fri 7am-7pm - Closed on  weekends  [facility]: Mon-Fri 7am-5pm - Closed on Sunday  [facility]: Mon-Fri 7am-6:30pm - Closed on weekends    STEP 8  Please call [facility] after return of your final jug to schedule your follow-up visit. [phone]

## 2021-06-03 NOTE — PROGRESS NOTES
Assessment/Plan:        Diagnoses and all orders for this visit:    Calculus of kidney  -     Magnesium, 24 Hour Urine; Future  -     Stone Formation, 24 Hour Urine (does not include Magnesium); Standing  -     Calcium, Ionized, Measured; Future; Expected date: 12/03/2019  -     Parathyroid Hormone Intact with Minerals; Future; Expected date: 12/03/2019  -     Patient Stated Goal: Prevent further stones  -     24 Hour Urine Collection Steps Education  -     Culture, Urine- Future; Future; Expected date: 12/19/2019  -     Culture, Urine- Future    Calculus of ureter  -     Urinalysis Macroscopic      Stone Management Plan  [facility] Stone Management 10/8/2019 10/17/2019 11/19/2019   Urinary Tract Infection No suspicion of infection No suspicion of infection No suspicion of infection   Renal Colic Well controlled symptoms Well controlled symptoms Asymptomatic at this time   Renal Failure No suspicion of renal failure No suspicion of renal failure No suspicion of renal failure   Current CT date 10/6/2019 - 11/19/2019   Right sided stones? Yes - Yes   R Number of ureteral stones 1 - No ureteral stones   R GSD of ureteral stones 7 - -   R Location of ureteral stone Proximal - -   R Number of kidney stones  No renal stones - 1   R GSD of kidney stones N/A - < 2   R Hydronephrosis Mild - None   R Stone Event New event Established event Resolved event   Diagnosis date 10/6/2019 - -   Initial location of primary symptomatic stone Proximal - -   Initial GSD of primary symptomatic stone 7 - -   Resolved date - - 11/19/2019   R Post-op status - Stent Removal <2 mm   R Current Plan Clear - Observe   Clear rationale Patient preference - -   Observe rationale - - Limited stone burden with good prognosis for spontaneous passage   Left sided stones? Yes - Yes   L Number of ureteral stones No ureteral stones - No ureteral stones   L Number of kidney stones  1 - Renal stones unchanged from last exam   L GSD of kidney stones 15 - 20 - 15 -  20   L Hydronephrosis None - None   L Stone Event No current event No current event No current event   L Current Plan Observe - Observe   Observe rationale Significant stone burden amenable to emergency management - Pending stone risk evaluation         Subjective:      HPI  Ms. Rosmery Heard is a 69 y.o.  female returning to the [facility] [place] for late postoperative follow-up.     She returns status post Right ureteroscopic extraction for proximal ureteral stone. She has had no unanticipated events.     She is asymptomatic at present. She denies symptoms of fever, chills, flank pain, nausea, vomiting, urinary frequency and dysuria.    New CT scan was personally reviewed and demonstrates largest residual fragment is 1 mm in the right renal lower pole with resolution of previous hydronephrosis. No change to aggregate of stones within left lower pole.    Stone composition was 60 % calcium oxalate and 40 % calcium phosphate (apatite).     PLAN    68 yo F with hx of stone disease s/p right ureteroscopic extraction of proximal ureteral stone. Tiny right renal stone and stable, dominant left renal stone burden.    She is at risk for ongoing active stone disease and will initiate stone risk evaluation. Serum stone risk chemistries including parathyroid hormone and ionized calcium will be obtained before next visit. Two 24 hour urine collections and dietary journal will be obtained at [facility].     Patient also seen and examined by Deborah Asencio PA-C       ROS   Review of systems is negative except for HPI.    Past Medical History:   Diagnosis Date     Cancer (H)     anal     Kidney stone        Past Surgical History:   Procedure Laterality Date     CYSTOSCOPY W/ URETERAL STENT PLACEMENT Right 10/11/2019    Procedure: RIGHT CYSTOURETEROSCOPY WITH BASKET STONE EXTRACTION  AND URETERAL STENT INSERTION;  Surgeon: Aayush Rider MD;  Location: [facility];  Service: Urology      DILATION AND CURETTAGE OF UTERUS       VULVA / PERINEUM BIOPSY N/A 12/20/2018    Procedure: EUA BIOPSY ANAL MASS;  Surgeon: Blaire Ashby MD;  Location: [facility];  Service: General       Current Outpatient Medications   Medication Sig Dispense Refill     HEMP OIL OR EXTRACT OR OTHER CBD CANNABINOID, NOT MEDICAL CANNABIS,        acetaminophen (TYLENOL) 500 MG tablet Take 500 mg by mouth every 6 (six) hours as needed for pain.       ibuprofen (ADVIL) 200 MG tablet Take 200 mg by mouth every 6 (six) hours as needed for pain.       No current facility-administered medications for this visit.        Allergies   Allergen Reactions     Other Environmental Allergy      Seasonal allergies       Social History     Socioeconomic History     Marital status:      Spouse name: Not on file     Number of children: Not on file     Years of education: Not on file     Highest education level: Not on file   Occupational History     Occupation: Works Independently   Social Needs     Financial resource strain: Not on file     Food insecurity:     Worry: Not on file     Inability: Not on file     Transportation needs:     Medical: Not on file     Non-medical: Not on file   Tobacco Use     Smoking status: Never Smoker     Smokeless tobacco: Never Used   Substance and Sexual Activity     Alcohol use: Yes     Frequency: Never     Comment: very rare     Drug use: No     Sexual activity: Not on file   Lifestyle     Physical activity:     Days per week: Not on file     Minutes per session: Not on file     Stress: Not on file   Relationships     Social connections:     Talks on phone: Not on file     Gets together: Not on file     Attends [employer] service: Not on file     Active member of club or organization: Not on file     Attends meetings of clubs or organizations: Not on file     Relationship status: Not on file     Intimate partner violence:     Fear of current or ex partner: Not on file     Emotionally abused: Not on  file     Physically abused: Not on file     Forced sexual activity: Not on file   Other Topics Concern     Not on file   Social History Narrative     Not on file       Family History   Problem Relation Age of Onset     Urolithiasis Father      Heart disease Father         mi     Diabetes Sister      Urolithiasis Cousin      Cancer Neg Hx      Gout Neg Hx      Clotting disorder Neg Hx      Objective:      Physical Exam  Vitals:    11/19/19 1148   BP: 149/65   Pulse: 79   Temp: 97.5  F (36.4  C)     General - well developed, well nourished, appropriate for age. Appears no distress at this time  Abdomen - slender soft, non-tender, no hepatosplenomegaly, no masses.   - no flank tenderness, no suprapubic tenderness, kidney and bladder non-palpable  MSK - normal spinal curvature. no spinal tenderness. normal gait. muscular strength intact.  Psych - oriented to time, place, and person, normal mood and affect.    Labs   Urinalysis POC (Office):  Nitrite, UA   Date Value Ref Range Status   11/19/2019 Negative Negative Final   10/17/2019 Negative Negative Final   10/06/2019 Negative Negative Final       Lab Urinalysis:  Blood, UA   Date Value Ref Range Status   11/19/2019 Trace (!) Negative Final   10/17/2019 Large (!) Negative Final   10/06/2019 Large (!) Negative Final     Nitrite, UA   Date Value Ref Range Status   11/19/2019 Negative Negative Final   10/17/2019 Negative Negative Final   10/06/2019 Negative Negative Final     Leukocytes, UA   Date Value Ref Range Status   11/19/2019 Small (!) Negative Final   10/17/2019 Small (!) Negative Final   10/06/2019 Small (!) Negative Final     pH, UA   Date Value Ref Range Status   11/19/2019 6.5 5.0 - 8.0 Final   10/17/2019 5.5 5.0 - 8.0 Final   10/06/2019 5.5 4.5 - 8.0 Final

## 2021-06-04 NOTE — PROGRESS NOTES
Assessment/Plan:        Diagnoses and all orders for this visit:    Calculus of kidney  -     CT Abdomen Pelvis Without Oral Without IV Contrast; Future; Expected date: 12/15/2020  -     Patient Stated Goal: Prevent further stones  -     Patient Increasing Fluids Education      Stone Management Plan  [facility] Stone Management 10/8/2019 10/17/2019 11/19/2019   Urinary Tract Infection No suspicion of infection No suspicion of infection No suspicion of infection   Renal Colic Well controlled symptoms Well controlled symptoms Asymptomatic at this time   Renal Failure No suspicion of renal failure No suspicion of renal failure No suspicion of renal failure   Current CT date 10/6/2019 - 11/19/2019   Right sided stones? Yes - Yes   R Number of ureteral stones 1 - No ureteral stones   R GSD of ureteral stones 7 - -   R Location of ureteral stone Proximal - -   R Number of kidney stones  No renal stones - 1   R GSD of kidney stones N/A - < 2   R Hydronephrosis Mild - None   R Stone Event New event Established event Resolved event   Diagnosis date 10/6/2019 - -   Initial location of primary symptomatic stone Proximal - -   Initial GSD of primary symptomatic stone 7 - -   Resolved date - - 11/19/2019   R Post-op status - Stent Removal <2 mm   R Current Plan Clear - Observe   Clear rationale Patient preference - -   Observe rationale - - Limited stone burden with good prognosis for spontaneous passage   Left sided stones? Yes - Yes   L Number of ureteral stones No ureteral stones - No ureteral stones   L Number of kidney stones  1 - Renal stones unchanged from last exam   L GSD of kidney stones 15 - 20 - 15 - 20   L Hydronephrosis None - None   L Stone Event No current event No current event No current event   L Current Plan Observe - Observe   Observe rationale Significant stone burden amenable to emergency management - Pending stone risk evaluation         Subjective:      HPI  Ms. Rosmery Heard is a 69 y.o.  female  returning to the [facility] [place] for review of initial stone risk evaluation.     She is a recurrent calcium oxalate and calcium phosphate (apatite) stone former who has not required stone clearance procedures. She has not previously participated in stone risk evaluation. She has identified modifiable stone risks including:  low urine volume and unclassified hypercalciuria. She has identified non-modifiable stone risks including:  multiple stones at presentation.     She is asymptomatic at present. She denies symptoms of fever, chills, flank pain, nausea, vomiting, urinary frequency and dysuria.     Evaluation of serum lab results demonstrates elevated PTH (185), elevated venous calcium and elevated ionized calcium. Two 24 hour urine collections demonstrate mild low urine volume and moderate hypercalciuria. Dietary journal demonstrates: low sodium consumption and low oxalate consumption.    PLAN    68 yo F with hx of calcium based stone disease. Initial stone prevention workup notable for hypercalciuria with elevated ionized calcium and parathyroid. Large aggregate of left renal stones, asymptomatic.    Pending further direction by PCP but would recommend     Based on review of findings with the patient, she will transition to long term management and return in 6 months with repeat imaging.    Patient also seen and examined by LINA Murray   Review of systems is negative except for HPI.    Past Medical History:   Diagnosis Date     Cancer (H)     anal     Kidney stone        Past Surgical History:   Procedure Laterality Date     CYSTOSCOPY W/ URETERAL STENT PLACEMENT Right 10/11/2019    Procedure: RIGHT CYSTOURETEROSCOPY WITH BASKET STONE EXTRACTION  AND URETERAL STENT INSERTION;  Surgeon: Aayush Rider MD;  Location: [facility];  Service: Urology     DILATION AND CURETTAGE OF UTERUS       VULVA / PERINEUM BIOPSY N/A 12/20/2018    Procedure: EUA BIOPSY ANAL MASS;   Surgeon: Blaire Ashby MD;  Location: [facility];  Service: General       Current Outpatient Medications   Medication Sig Dispense Refill     acetaminophen (TYLENOL) 500 MG tablet Take 500 mg by mouth every 6 (six) hours as needed for pain.       HEMP OIL OR EXTRACT OR OTHER CBD CANNABINOID, NOT MEDICAL CANNABIS,        ibuprofen (ADVIL) 200 MG tablet Take 200 mg by mouth every 6 (six) hours as needed for pain.       No current facility-administered medications for this visit.        Allergies   Allergen Reactions     Other Environmental Allergy      Seasonal allergies       Social History     Socioeconomic History     Marital status:      Spouse name: Not on file     Number of children: Not on file     Years of education: Not on file     Highest education level: Not on file   Occupational History     Occupation: Works Independently   Social Needs     Financial resource strain: Not on file     Food insecurity:     Worry: Not on file     Inability: Not on file     Transportation needs:     Medical: Not on file     Non-medical: Not on file   Tobacco Use     Smoking status: Never Smoker     Smokeless tobacco: Never Used   Substance and Sexual Activity     Alcohol use: Yes     Frequency: Never     Comment: very rare     Drug use: No     Sexual activity: Not on file   Lifestyle     Physical activity:     Days per week: Not on file     Minutes per session: Not on file     Stress: Not on file   Relationships     Social connections:     Talks on phone: Not on file     Gets together: Not on file     Attends [employer]al service: Not on file     Active member of club or organization: Not on file     Attends meetings of clubs or organizations: Not on file     Relationship status: Not on file     Intimate partner violence:     Fear of current or ex partner: Not on file     Emotionally abused: Not on file     Physically abused: Not on file     Forced sexual activity: Not on file   Other Topics Concern     Not on file    Social History Narrative     Not on file       Family History   Problem Relation Age of Onset     Urolithiasis Father      Heart disease Father         mi     Diabetes Sister      Urolithiasis Cousin      Cancer Neg Hx      Gout Neg Hx      Clotting disorder Neg Hx      Objective:      Physical Exam  Vitals:    12/16/19 1533   BP: 138/65   Pulse: 71   Temp: 97.7  F (36.5  C)     General - well developed, well nourished, appropriate for age. Appears no distress at this time  Abdomen - slender soft, non-tender, no hepatosplenomegaly, no masses.   - no flank tenderness, no suprapubic tenderness, kidney and bladder non-palpable  MSK - normal spinal curvature. no spinal tenderness. normal gait. muscular strength intact.  Psych - oriented to time, place, and person, normal mood and affect.    Labs   Urinalysis POC (Office):  Nitrite, UA   Date Value Ref Range Status   11/19/2019 Negative Negative Final   10/17/2019 Negative Negative Final   10/06/2019 Negative Negative Final       Lab Urinalysis:  Blood, UA   Date Value Ref Range Status   11/19/2019 Trace (!) Negative Final   10/17/2019 Large (!) Negative Final   10/06/2019 Large (!) Negative Final     Nitrite, UA   Date Value Ref Range Status   11/19/2019 Negative Negative Final   10/17/2019 Negative Negative Final   10/06/2019 Negative Negative Final     Leukocytes, UA   Date Value Ref Range Status   11/19/2019 Small (!) Negative Final   10/17/2019 Small (!) Negative Final   10/06/2019 Small (!) Negative Final     pH, UA   Date Value Ref Range Status   11/19/2019 6.5 5.0 - 8.0 Final   10/17/2019 5.5 5.0 - 8.0 Final   10/06/2019 5.5 4.5 - 8.0 Final    and Stone prevention labs   Serum chemistries   Creatinine   Date Value Ref Range Status   11/19/2019 0.71 0.60 - 1.10 mg/dL Final   02/26/2019 0.49 (L) 0.60 - 1.10 mg/dL Final   02/24/2019 0.59 (L) 0.60 - 1.10 mg/dL Final     Potassium   Date Value Ref Range Status   02/28/2019 4.0 3.5 - 5.0 mmol/L Final   02/28/2019  3.5 3.5 - 5.0 mmol/L Final   02/27/2019 4.0 3.5 - 5.0 mmol/L Final     Calcium   Date Value Ref Range Status   11/19/2019 10.3 8.5 - 10.5 mg/dL Final   02/26/2019 7.9 (L) 8.5 - 10.5 mg/dL Final   02/24/2019 8.4 (L) 8.5 - 10.5 mg/dL Final     Phosphorus   Date Value Ref Range Status   11/19/2019 3.3 2.5 - 4.5 mg/dL Final   , Calcium metabolism   Calcium, Ionized Measured   Date Value Ref Range Status   11/19/2019 1.37 (H) 1.11 - 1.30 mmol/L Final      PTH   Date Value Ref Range Status   11/19/2019 185 (H) 10 - 86 pg/mL Final     Vitamin D, Total (25-Hydroxy)   Date Value Ref Range Status   12/13/2019 20.8 (L) 30.0 - 80.0 ng/mL Final   12/05/2018 28.3 (L) 30.0 - 80.0 ng/mL Final     and 24 hour urine   Calcium, 24H Urine   Date Value Ref Range Status   11/28/2019 314 (H) 20 - 275 mg/24hr Final     Comment:     Hypercalciuria >350  Values are for persons with  average daily calcium intake  (600-800 mg/day)   11/21/2019 358 (H) 20 - 275 mg/24hr Final     Comment:     Hypercalciuria >350  Values are for persons with  average daily calcium intake  (600-800 mg/day)     Sodium, 24 Hour Urine   Date Value Ref Range Status   11/28/2019 153 40 - 217 mmol/24hr Final   11/21/2019 132 40 - 217 mmol/24hr Final     Citrate, 24 Hour Urine   Date Value Ref Range Status   11/28/2019 622 434-1,191 mg/24hr Final     Comment:     Reference Ranges are not  established for 0-19 years  or >60 years of age.   11/21/2019 613 434-1,191 mg/24hr Final     Comment:     Reference Ranges are not  established for 0-19 years  or >60 years of age.     Oxalate, 24 Hour Urine   Date Value Ref Range Status   11/28/2019 33.8 7.0 - 44.0 mg/24hr Final   11/21/2019 33.8 7.0 - 44.0 mg/24hr Final     pH, Urine   Date Value Ref Range Status   11/28/2019 6.5 4.5 - 8.0 Final   11/21/2019 6.0 4.5 - 8.0 Final     Urine Volume   Date Value Ref Range Status   11/28/2019 1,500 mL Final   11/21/2019 1,625 mL Final           I will START or STAY ON the medications listed below when I get home from the hospital:    ibuprofen 600 mg oral tablet  -- 1 tab(s) by mouth every 6 hours  -- Indication: For pain    acetaminophen 325 mg oral tablet  -- 3 tab(s) by mouth   -- Indication: For pain

## 2021-06-04 NOTE — PATIENT INSTRUCTIONS - HE
Patient Stated Goal: Prevent further stones  INCREASING FLUIDS TO DECREASE RISK    Low Urinary Volume:     Kidney stones form because there is not enough water to dissolve the concentrated chemicals and minerals in urine.     Studies have found that people who make kidney stones should drink enough fluids so that they produce at least 2 liters (2 quarts) of urine per day.     Studies have shown that virtually every fluid you might drink decreases the risk of stone formation. Acceptable fluids include milk, coffee, diet and regular sodas, alcoholic beverages, fruit juices and even plain old water.    Increasing fluid intake will increase urine volume and decrease the chance of kidney stone formation.    Fluids:    Anything liquid that fits in a glass counts.     One way to gauge if your body has enough fluids is to check the color of your urine. If the urine is dark and yellow you do not have enough fluids. Urine will be pale yellow to clear if your body has enough fluids.    What we need to survive:    Most fluid we drink is lost through evaporation, more if active in hot humid environments    Body wastes can be cleared in a small amount of urine    All fluid that is consumed in excess of necessary losses  dilutes the urine    Ways to Increase Fluids Daily:    Drink more fluids throughout the day and into the evening. (You may need to awake from sleep to urinate which is a good indication of volume status)    Keep your refrigerator stocked with beverages you like    Drink a variety of fluids - mix it up    Add another beverage to your regular beverage at every meal    Keep a beverage at your desk (work area) and finish it before you leave for breaks, meetings, lunch and end of day    Use larger volume glasses    Whenever you pass a water fountain - stop and drink    Drink a beverage with snacks, if it is a salty snack, double the beverage    Take medication with a full glass of water/fluid    Keep a bottle of  water in your car and drink every 20 miles    Eat high water content fruits (melons, oranges, grapes, etc.)    Flavor water with a squeeze of lemon or lime or Crystal Light     If you do something repetitive throughout the day, link it with having something to drink    When you give your child/children something to drink, you have something to drink also    [facility] [place] can respond to your questions or concerns 24 hours a day at [phone].

## 2021-06-09 NOTE — PROGRESS NOTES
Assessment/Plan:        Diagnoses and all orders for this visit:    Calculus of kidney  -     Patient Stated Goal: Prevent further stones  -     CT Abdomen Pelvis Without Oral Without IV Contrast; Future; Expected date: 06/22/2021    Other orders  -     azelastine (ASTELIN) 137 mcg (0.1 %) nasal spray; as needed.      Stone Management Plan  [facility] Stone Management 11/19/2019 12/16/2019 6/22/2020   Urinary Tract Infection No suspicion of infection No suspicion of infection No suspicion of infection   Renal Colic Asymptomatic at this time Asymptomatic at this time Asymptomatic at this time   Renal Failure No suspicion of renal failure No suspicion of renal failure No suspicion of renal failure   Current CT date 11/19/2019 - 6/11/2020   Right sided stones? Yes - Yes   R Number of ureteral stones No ureteral stones - No ureteral stones   R GSD of ureteral stones - - -   R Location of ureteral stone - - -   R Number of kidney stones  1 - 1   R GSD of kidney stones < 2 - 2 - 4   R Hydronephrosis None - None   R Stone Event Resolved event No current event No current event   Diagnosis date - - -   Initial location of primary symptomatic stone - - -   Initial GSD of primary symptomatic stone - - -   Resolved date 11/19/2019 - -   R Post-op status <2 mm - -   R Current Plan Observe Observe Observe   Clear rationale - - -   Observe rationale Limited stone burden with good prognosis for spontaneous passage Limited stone burden with good prognosis for spontaneous passage Limited stone burden with good prognosis for spontaneous passage   Left sided stones? Yes - Yes   L Number of ureteral stones No ureteral stones - No ureteral stones   L Number of kidney stones  Renal stones unchanged from last exam - Renal stones unchanged from last exam   L GSD of kidney stones 15 - 20 - 15 - 20   L Hydronephrosis None - None   L Stone Event No current event No current event No current event   L Current Plan Observe Observe Observe   Observe  "rationale Pending stone risk evaluation Pending stone risk evaluation Significant stone burden amenable to emergency management             Phone call duration: 14 minutes    Aayush Rider MD     Subjective:      The patient has been notified of following:     \"This telephone visit will be conducted via a call between you and your physician/provider. We have found that certain health care needs can be provided without the need for a physical exam.  This service lets us provide the care you need with a short phone conversation.  If a prescription is necessary we can send it directly to your pharmacy.  If labs and/or imaging are needed, we can place orders so you can have the test (s) done at a later time.    If during the course of the call the physician/provider feels a telephone visit is not appropriate, you will not be charged for this service.\"     HPI  Ms. Rosmery Heard is a 70 y.o.  female who is being evaluated via a billable telephone visit by [facility] [place] for follow up of her stone disease.    She has don well in the interim with no stone symptoms. She had 2/4 parathyroidectomy at [place] without complication.    CT demonstrates slight increase in size of right lower pole stone and stable large left lower pole stone(s).    Will follow with CT in one year.     ROS   Review of systems is negative except for HPI.    Past Medical History:   Diagnosis Date     Cancer (H)     anal     Kidney stone        Past Surgical History:   Procedure Laterality Date     CYSTOSCOPY W/ URETERAL STENT PLACEMENT Right 10/11/2019    Procedure: RIGHT CYSTOURETEROSCOPY WITH BASKET STONE EXTRACTION  AND URETERAL STENT INSERTION;  Surgeon: Aayush Rider MD;  Location: [facility];  Service: Urology     DILATION AND CURETTAGE OF UTERUS       VULVA / PERINEUM BIOPSY N/A 12/20/2018    Procedure: EUA BIOPSY ANAL MASS;  Surgeon: Blaire Ashby MD;  Location: [facility];  Service: " General       Current Outpatient Medications   Medication Sig Dispense Refill     azelastine (ASTELIN) 137 mcg (0.1 %) nasal spray as needed.       HEMP OIL OR EXTRACT OR OTHER CBD CANNABINOID, NOT MEDICAL CANNABIS,        No current facility-administered medications for this visit.        Allergies   Allergen Reactions     Other Environmental Allergy      Seasonal allergies       Social History     Socioeconomic History     Marital status:      Spouse name: Not on file     Number of children: Not on file     Years of education: Not on file     Highest education level: Not on file   Occupational History     Occupation: Works Independently   Social Needs     Financial resource strain: Not on file     Food insecurity     Worry: Not on file     Inability: Not on file     Transportation needs     Medical: Not on file     Non-medical: Not on file   Tobacco Use     Smoking status: Never Smoker     Smokeless tobacco: Never Used   Substance and Sexual Activity     Alcohol use: Yes     Frequency: Never     Comment: very rare     Drug use: No     Sexual activity: Not on file   Lifestyle     Physical activity     Days per week: Not on file     Minutes per session: Not on file     Stress: Not on file   Relationships     Social connections     Talks on phone: Not on file     Gets together: Not on file     Attends Hoahaoism service: Not on file     Active member of club or organization: Not on file     Attends meetings of clubs or organizations: Not on file     Relationship status: Not on file     Intimate partner violence     Fear of current or ex partner: Not on file     Emotionally abused: Not on file     Physically abused: Not on file     Forced sexual activity: Not on file   Other Topics Concern     Not on file   Social History Narrative     Not on file       Family History   Problem Relation Age of Onset     Urolithiasis Father      Heart disease Father         mi     Diabetes Sister      Urolithiasis Cousin       Cancer Neg Hx      Gout Neg Hx      Clotting disorder Neg Hx        Objective:      Labs   Urinalysis POC (Office):  Nitrite, UA   Date Value Ref Range Status   11/19/2019 Negative Negative Final   10/17/2019 Negative Negative Final   10/06/2019 Negative Negative Final       Lab Urinalysis:  Blood, UA   Date Value Ref Range Status   11/19/2019 Trace (!) Negative Final   10/17/2019 Large (!) Negative Final   10/06/2019 Large (!) Negative Final     Nitrite, UA   Date Value Ref Range Status   11/19/2019 Negative Negative Final   10/17/2019 Negative Negative Final   10/06/2019 Negative Negative Final     Leukocytes, UA   Date Value Ref Range Status   11/19/2019 Small (!) Negative Final   10/17/2019 Small (!) Negative Final   10/06/2019 Small (!) Negative Final     pH, UA   Date Value Ref Range Status   11/19/2019 6.5 5.0 - 8.0 Final   10/17/2019 5.5 5.0 - 8.0 Final   10/06/2019 5.5 4.5 - 8.0 Final    and Stone prevention labs   Calcium metabolism   Calcium, Ionized Measured   Date Value Ref Range Status   11/19/2019 1.37 (H) 1.11 - 1.30 mmol/L Final      PTH   Date Value Ref Range Status   11/19/2019 185 (H) 10 - 86 pg/mL Final     Vitamin D, Total (25-Hydroxy)   Date Value Ref Range Status   12/13/2019 20.8 (L) 30.0 - 80.0 ng/mL Final   12/05/2018 28.3 (L) 30.0 - 80.0 ng/mL Final

## 2021-06-16 PROBLEM — N20.0 CALCULUS OF KIDNEY: Status: ACTIVE | Noted: 2019-10-08

## 2021-06-16 PROBLEM — N39.0 URINARY TRACT INFECTION: Status: ACTIVE | Noted: 2019-02-23

## 2021-06-16 PROBLEM — N20.1 CALCULUS OF URETER: Status: ACTIVE | Noted: 2019-10-08

## 2021-06-22 NOTE — ANESTHESIA CARE TRANSFER NOTE
Last vitals:   Vitals:    12/20/18 1259   BP: 101/50   Pulse: 77   Resp: 16   Temp: 36.8  C (98.2  F)   SpO2: 96%     Patient's level of consciousness is drowsy  Spontaneous respirations: yes  Maintains airway independently: yes  Dentition unchanged: yes  Oropharynx: oropharynx clear of all foreign objects    QCDR Measures:  ASA# 20 - Surgical Safety Checklist: WHO surgical safety checklist completed prior to induction    PQRS# 430 - Adult PONV Prevention: 4558F - Pt received => 2 anti-emetic agents (different classes) preop & intraop  ASA# 8 - Peds PONV Prevention: NA - Not pediatric patient, not GA or 2 or more risk factors NOT present  PQRS# 424 - [place]-op Temp Management: 4559F - At least one body temp DOCUMENTED => 35.5C or 95.9F within required timeframe  PQRS# 426 - PACU Transfer Protocol: - Transfer of care checklist used  ASA# 14 - Acute Post-op Pain: ASA14B - Patient did NOT experience pain >= 7 out of 10

## 2021-06-22 NOTE — ANESTHESIA POSTPROCEDURE EVALUATION
Patient: Rosmery Heard  EUA BIOPSY ANAL MASS  Anesthesia type: MAC    Patient location: Phase II Recovery  Last vitals:   Vitals:    12/20/18 1259   BP: 101/50   Pulse: 77   Resp: 16   Temp: 36.8  C (98.2  F)   SpO2: 96%     Post vital signs: stable  Level of consciousness: awake and responds to simple questions  Post-anesthesia pain: pain controlled  Post-anesthesia nausea and vomiting: no  Pulmonary: unassisted, return to baseline  Cardiovascular: stable and blood pressure at baseline  Hydration: adequate  Anesthetic events: no    QCDR Measures:  ASA# 11 - [place]-op Cardiac Arrest: ASA11B - Patient did NOT experience unanticipated cardiac arrest  ASA# 12 - [place]-op Mortality Rate: ASA12B - Patient did NOT die  ASA# 13 - PACU Re-Intubation Rate: ASA13B - Patient did NOT require a new airway mgmt  ASA# 10 - Composite Anes Safety: ASA10A - No serious adverse event    Additional Notes:

## 2021-06-22 NOTE — ANESTHESIA PREPROCEDURE EVALUATION
Anesthesia Evaluation      Patient summary reviewed   No history of anesthetic complications     Airway   Mallampati: II  Neck ROM: full   Pulmonary - negative ROS and normal exam    breath sounds clear to auscultation                         Cardiovascular - negative ROS and normal exam   Neuro/Psych - negative ROS     Endo/Other - negative ROS      GI/Hepatic/Renal - negative ROS           Dental    (+) caps                       Anesthesia Plan  Planned anesthetic: MAC    ASA 1   Induction: intravenous   Anesthetic plan and risks discussed with: patient  Anesthesia plan special considerations: antiemetics,   Post-op plan: routine recovery

## 2021-06-26 ENCOUNTER — HEALTH MAINTENANCE LETTER (OUTPATIENT)
Age: 71
End: 2021-06-26

## 2021-06-30 ENCOUNTER — AMBULATORY - HEALTHEAST (OUTPATIENT)
Dept: UROLOGY | Facility: CLINIC | Age: 71
End: 2021-06-30

## 2021-06-30 ENCOUNTER — HOSPITAL ENCOUNTER (OUTPATIENT)
Dept: CT IMAGING | Facility: CLINIC | Age: 71
Discharge: HOME | End: 2021-06-30
Attending: PHYSICIAN ASSISTANT
Payer: MEDICARE

## 2021-06-30 ENCOUNTER — HOSPITAL ENCOUNTER (OUTPATIENT)
Dept: CT IMAGING | Facility: CLINIC | Age: 71
Discharge: HOME | End: 2021-06-30
Payer: MEDICARE

## 2021-06-30 DIAGNOSIS — N20.0 CALCULUS OF KIDNEY: ICD-10-CM

## 2021-07-16 ENCOUNTER — TELEPHONE (OUTPATIENT)
Dept: UROLOGY | Facility: CLINIC | Age: 71
End: 2021-07-16

## 2021-07-20 ENCOUNTER — TELEPHONE (OUTPATIENT)
Dept: UROLOGY | Facility: CLINIC | Age: 71
End: 2021-07-20

## 2021-07-23 ENCOUNTER — LAB (OUTPATIENT)
Dept: LAB | Facility: HOSPITAL | Age: 71
End: 2021-07-23
Payer: MEDICARE

## 2021-07-23 DIAGNOSIS — N20.0 CALCULUS OF KIDNEY: ICD-10-CM

## 2021-07-23 PROCEDURE — 87186 SC STD MICRODIL/AGAR DIL: CPT

## 2021-07-23 PROCEDURE — 87086 URINE CULTURE/COLONY COUNT: CPT

## 2021-07-25 LAB — BACTERIA UR CULT: ABNORMAL

## 2021-07-28 ENCOUNTER — TELEPHONE (OUTPATIENT)
Dept: UROLOGY | Facility: CLINIC | Age: 71
End: 2021-07-28

## 2021-07-28 DIAGNOSIS — N39.0 URINARY TRACT INFECTION: Primary | ICD-10-CM

## 2021-07-28 DIAGNOSIS — N20.0 CALCULUS OF KIDNEY: ICD-10-CM

## 2021-07-28 RX ORDER — SULFAMETHOXAZOLE/TRIMETHOPRIM 800-160 MG
1 TABLET ORAL 2 TIMES DAILY
Qty: 14 TABLET | Refills: 0 | Status: SHIPPED | OUTPATIENT
Start: 2021-07-28 | End: 2021-10-25

## 2021-07-28 NOTE — TELEPHONE ENCOUNTER
Patient called back and was updated on her culture results.  She will pick the antibiotic and will start today.  She will call with any issues.    Melly Mistry RN

## 2021-07-28 NOTE — TELEPHONE ENCOUNTER
Message left for patient to call back to [facility] regarding her urine culture results.  Melly Mistry RN

## 2021-08-06 DIAGNOSIS — N20.0 CALCULUS OF KIDNEY: ICD-10-CM

## 2021-08-06 DIAGNOSIS — N39.0 URINARY TRACT INFECTION: Primary | ICD-10-CM

## 2021-08-23 ENCOUNTER — LAB REQUISITION (OUTPATIENT)
Dept: LAB | Facility: CLINIC | Age: 71
End: 2021-08-23
Payer: MEDICARE

## 2021-08-23 DIAGNOSIS — R30.0 DYSURIA: ICD-10-CM

## 2021-08-23 PROCEDURE — 87086 URINE CULTURE/COLONY COUNT: CPT | Mod: ORL | Performed by: FAMILY MEDICINE

## 2021-08-25 LAB — BACTERIA UR CULT: ABNORMAL

## 2021-10-13 ENCOUNTER — LAB REQUISITION (OUTPATIENT)
Dept: LAB | Facility: CLINIC | Age: 71
End: 2021-10-13
Payer: MEDICARE

## 2021-10-13 DIAGNOSIS — Z87.440 PERSONAL HISTORY OF URINARY (TRACT) INFECTIONS: ICD-10-CM

## 2021-10-13 PROCEDURE — 87086 URINE CULTURE/COLONY COUNT: CPT | Mod: ORL | Performed by: FAMILY MEDICINE

## 2021-10-15 LAB — BACTERIA UR CULT: ABNORMAL

## 2021-10-16 ENCOUNTER — HEALTH MAINTENANCE LETTER (OUTPATIENT)
Age: 71
End: 2021-10-16

## 2021-10-25 ENCOUNTER — VIRTUAL VISIT (OUTPATIENT)
Dept: UROLOGY | Facility: CLINIC | Age: 71
End: 2021-10-25
Payer: MEDICARE

## 2021-10-25 DIAGNOSIS — N30.01 ACUTE CYSTITIS WITH HEMATURIA: ICD-10-CM

## 2021-10-25 DIAGNOSIS — N20.0 CALCULUS OF KIDNEY: Primary | ICD-10-CM

## 2021-10-25 PROCEDURE — 99442 PR PHYSICIAN TELEPHONE EVALUATION 11-20 MIN: CPT | Performed by: UROLOGY

## 2021-10-25 RX ORDER — GABAPENTIN 100 MG/1
300 CAPSULE ORAL
Status: CANCELLED | OUTPATIENT
Start: 2021-10-25

## 2021-10-25 RX ORDER — NITROFURANTOIN MACROCRYSTAL 100 MG
100 CAPSULE ORAL 2 TIMES DAILY
Qty: 60 CAPSULE | Refills: 0 | Status: ON HOLD | OUTPATIENT
Start: 2021-10-25 | End: 2021-11-17

## 2021-10-25 RX ORDER — ACETAMINOPHEN 500 MG
1000 TABLET ORAL
Status: CANCELLED | OUTPATIENT
Start: 2021-10-25

## 2021-10-25 RX ORDER — OXYCODONE HYDROCHLORIDE 5 MG/1
5-10 TABLET ORAL EVERY 4 HOURS PRN
Qty: 20 TABLET | Refills: 0 | Status: SHIPPED | OUTPATIENT
Start: 2021-10-25 | End: 2021-12-20

## 2021-10-25 ASSESSMENT — PAIN SCALES - GENERAL: PAINLEVEL: NO PAIN (0)

## 2021-10-25 NOTE — PROGRESS NOTES
Patient is roomed via telephone for a virtual visit.  Patient confirmed she is in the [facility] at the time of this appointment.  Patient understands that this virtual visit is billable and agree to proceed with appointment.

## 2021-10-25 NOTE — PATIENT INSTRUCTIONS
Patient Stated Goal: Know what to expect after surgery  Ureteroscopy    Ureteroscopy is a procedure which is done for clearance of stones from the ureter, kidney or both. There are no incisions involved. The procedure involves your surgeon placing a small scope into your urethra. This is the opening where urine leaves your body.  The surgeon watches as they carefully guide the scope to the stone(s).  Modern flexible ureteroscopes can be used to reach virtually any location within the urinary tract.     The size, shape and location of the stone determines how best to treat the stone(s).  Whenever possible, stones are removed in one piece.  Larger stones need to be broken using a laser before removing in smaller pieces.  The goal is to remove all stones and stone fragments from that side of the body in a single treatment.  Complete stone clearance is an important step to prevent future kidney stone episodes.    Surgery:    Same day outpatient procedure    30-60 minutes    Procedure done in hospital surgical suite    General anesthesia (you will be asleep during the procedure)     Antibiotic prior to surgery to prevent infection    Physician will visit with you and respond to any questions or concerns and consent will be signed prior to going to the operating room    Risks:    Infection - Preoperative antibiotics should prevent new infections but it is possible that unanticipated bacteria may be introduced at time of surgery or that the stones were actually chronically infected before surgery      Injury - The ureter may be injured during this procedure.  This is most likely to happen if the ureter was very inflamed before surgery or if a stone is very tightly impacted.  The surgeon will not aggressively treat a stone if this creates a risk of injury.        Inaccessible Stones -A single procedure is effective in 95% of cases, but if your ureter is very narrow or your kidney stone is very impacted, a stent will be  placed and the procedure stopped.  In 1-2 weeks after the ureter has relaxed, the patient will be brought back to surgery and the procedure can be safely performed.      Incomplete stone clearance -Occasionally stone or stone fragments may not be completely cleared.  These may pass on their own, which may cause discomfort.  Our goal is to remove all possible stones and fragments.    Stent:      An internal soft tube will be placed between the kidney and the bladder while in surgery (after the stone is cleared). The stent will keep the kidney draining.    What should I expect?     It is common for a stent to cause some irritation and discomfort.   You may have:      The need to urinate suddenly     The need to urinate often     Pain during urination     A dull backache, which may get worse during urination     Blood stained urine (like fruit punch) and occasional small clots    It s important to remember the stent is necessary and only temporary. To feel more comfortable:      Drink more than you normally would but you do not have to constantly  flush your kidneys     Limiting your activity may decrease irritation or bleeding    Ibuprofen - 2 tablets every 6-8 hours     Use pain medications as directed.    When is the stent removed?    Most stents are removed within 5 days to 2 weeks after a procedure.     How is the stent removed?     Your stent will be removed in the [facility] with a small telescope and a grasping tool.  It usually takes less than 1 minute to remove the stent.    What should I expect after the stent is removed?     You should feel normal by the next day    Some patients find:    An increase in back pain about an hour after the stent is removed as the kidney fills up with urine before it starts to empty.  It can be as uncomfortable as your initial stone episode.  Taking pain medications before stent removal may be helpful, but you would need someone else to drive you to and from your  appointment.    Bladder symptoms usually disappear by the next morning.    Small amounts of blood in the urine may be seen occasionally for up to a week.    Diet:      After surgery, there are no dietary restrictions - Drink to thirst, there is no need to increase intake of fluids, as this may increase nausea symptoms. Try to eat smaller, more frequent snacks, instead of large meals.    Activity:    Many people return to work within 1-2 days. Fatigue is normal for a couple of weeks following surgery. With increased activity you may experience more discomfort and you may notice more blood in your urine.      Post-Operative Symptom Control    While you recover from your procedure, you can take steps to ease your recovery.    Medications that prevent further episodes of severe pain and help stones pass: Take these as prescribed on a regular basis even if you are NOT in pain      Ibuprofen (Advil or Motrin) - Is available over the counter Take 2 (200mg) tablets every 6 hours until the stone passes.  o prevents spasm of the ureter.    o Decreases pain      Dramamine - (drowsy version, non-generic formulation) Is available over the counter and decreases spasm of the ureter.  Take 50mg at bedtime every night until the stone passes. In addition, take every 6 hours as needed.  Dramamine:  o Decreases nausea  o Decreases acute pain  o Decreases recurrence of pain for next 24 hours  o Will help you sleep        *This medication will cause increased drowsiness, do not drive or operate machinery for 6 hours      Flomax- Studies show that Flomax decreases irritation from stents.   o Take every day with food until stone passes even if you do not have pain  o Flomax does not relieve pain.        *This medication may cause nasal congestion or light-headedness      Detrol ( Tolterodine) - After surgery Detrol may decrease stent irritation and pelvic pain  o Take as prescribed     *This medication may cause dry mouth, constipation or  blurry vision. Stop medication if unable to urinate.    Medication that are taken as needed to manage break through symptoms: Take these ONLY as required and hopefully not at all      Narcotics (Percocet, Vicodin, Dilaudid)- take as prescribed for severe pain unrelieved by ibuprofen and dramamine  o Take as prescribed for severe pain  o Narcotics have significant side effects and only  cover-up  pain. They have no effect on cause of pain.  o Common side effects:  - Confusion, disorientation and sedation - DO NOT DRIVE OR OPERATE MACHINERY WITHIN 24 HOURS  - Nausea - take Dramamine or Zofran  or Haldol to help control  - Constipation  - Sleep disturbances      Ondansetron (Zofran)-  o Take as prescribed  o Reserve for severe nausea  o May cause constipation, start over the counter Miralax if needed to treat this    Haldol-  o Take as prescribed  o Reserve for severe nausea    Warning Signs/Symptoms - Please call the [facility] 24 hours a day at [phone] IMMEDIATELY if you experience any of these:    Fever greater than 100.1     Chills    Pain NOT CONTROLLED by pain medications    Heavy bleeding or large clots in urine (small clots can be normal)    Persistent nausea and/or vomiting    Post-Operative Follow up:    The stone(s) will be sent from surgery to a lab for composition analysis.  These results are usually available before a one month post-operative visit.  If you had laser treatment to break up your stone, you will usually be scheduled for a low dose CT scan prior to your one month appointment.  This scan allows your surgeon to confirm that all stone fragments were cleared at time of surgery and that there have been no complications.  These results along with possible labs and urine studies will help us develop an individualized plan to prevent new stones from forming and keep existing stones from enlarging.  This visit is usually scheduled about 1 month after your original surgery.    The  [facility] can respond to your questions or concerns 24 hours a day at [phone].

## 2021-10-25 NOTE — PROGRESS NOTES
Assessment/Plan:    Assessment & Plan   Rosmery was seen today for uti.    Diagnoses and all orders for this visit:    Calculus of kidney  -     oxyCODONE (ROXICODONE) 5 MG tablet; Take 1-2 tablets (5-10 mg) by mouth every 4 hours as needed for pain  -     Patient Stated Goal: Know what to expect after surgery  -     Case Request: CYSTOURETEROSCOPY, WITH LASER LITHOTRIPSY, CALCULUS REMOVAL AND URETERAL STENT INSERTION; Standing  -     Case Request: CYSTOURETEROSCOPY, WITH LASER LITHOTRIPSY, CALCULUS REMOVAL AND URETERAL STENT INSERTION    Acute cystitis with hematuria  -     nitroFURantoin macrocrystal (MACRODANTIN) 100 MG capsule; Take 1 capsule (100 mg) by mouth 2 times daily    Other orders  -     XR Surgery MOISES Fluoro L/T 5 Min; Standing  -     gentamicin (GARAMYCIN) 80 mg in sterile water (bottle) 3,000 mL irrigation  -     XR Surgery MOISES Fluoro L/T 5 Min        Stone Management Plan  Stone Management 6/22/2020 7/1/2021 10/25/2021   Urinary Tract Infection No suspicion of infection No suspicion of infection Suspected Infection   Renal Colic Asymptomatic at this time Asymptomatic at this time Well controlled symptoms   Renal Failure No suspicion of renal failure No suspicion of renal failure No suspicion of renal failure   Current CT date 6/11/2020 6/30/2021 7/30/2021   Right sided stones? Yes Yes Yes   R Number of ureteral stones No ureteral stones No ureteral stones No ureteral stones   R GSD of ureteral stones - - -   R Location of ureteral stone - - -   R Number of kidney stones  1 2 1   R GSD of kidney stones 2 - 4 2 - 4 4 - 10   R Hydronephrosis None None None   R Stone Event No current event No current event No current event   Diagnosis date - - -   Initial location of primary symptomatic stone - - -   Initial GSD of primary symptomatic stone - - -   Resolved date - - -   R Current Plan Observe Observe -   Clear rationale - - -   Observe rationale Limited stone burden with good prognosis for spontaneous  passage Limited stone burden with good prognosis for spontaneous passage -   Left sided stones? Yes Yes Yes   L Number of ureteral stones No ureteral stones No ureteral stones 1   L Number of kidney stones  Renal stones unchanged from last exam 1 1   L GSD of kidney stones 15 - 20 15 - 20 10 - 15   L Hydronephrosis None None None   L Stone Event No current event New event Established event   Diagnosis date - 6/30/2021 -   Initial location of primary symptomatic stone - Renal -   Initial GSD of primary symptomatic stone - 15 -   L Current Plan Observe Clear Clear   Clear rationale - Poor prognosis Associated treated infection   Observe rationale Significant stone burden amenable to emergency management - -           PLAN      Phone call duration: 15 minutes  20 minutes spent on the date of the encounter doing chart review, history and exam, documentation and further activities per the note    DWIGHT DOUGLAS MD  [facility]    Subjective:     HPI  Ms. Rosmery Heard is a 71 year old  female who is being evaluated via a billable telephone visit by [facility] [place] for follow up of her stone disease.    She has had repeated ESBL UTI thought to be associated with her large left lower pole stone. Gets some transient relief with macrobid, which is the only susceptible PO antibiotic, but then feels dragged out and low energy shortly after. No fever or chills. No hematuria. Mild left flank pain.    CT demonstrates large 14 mm left lower pole stone.    We discussed clearing the stone ureteroscopically. Idelaly the stone would be cleared percutaneously but hospital resources are strained for what would be an inpatient procedure and she wants to avoid hospitals because of COVID anyway. Should be able to reduce the stone to dust effectively and noted that there was some risk of persistence. Could always abort and return percutaneously should the stone  prove to not be amenable to ureteroscopic approach.    Will attempt to reduce bacterial load with at least 2 weeks of pre-op macrobid and continue post-procedure.         ROS   Review of systems is negative except for HPI.    Past Medical History:   Diagnosis Date     Cancer (H)     anal     Kidney stone      UTI (urinary tract infection) 05/2021       Past Surgical History:   Procedure Laterality Date     BIOPSY VULVA N/A 12/20/2018    Procedure: EUA BIOPSY ANAL MASS;  Surgeon: Blaire Ashby MD;  Location: [facility];  Service: General     COMBINED CYSTOSCOPY, INSERT STENT URETER(S) Right 10/11/2019    Procedure: RIGHT CYSTOURETEROSCOPY WITH BASKET STONE EXTRACTION  AND URETERAL STENT INSERTION;  Surgeon: Aayush Rider MD;  Location: [facility];  Service: Urology     DILATION AND CURETTAGE         Current Outpatient Medications   Medication Sig Dispense Refill     azelastine (ASTELIN) 137 mcg (0.1 %) nasal spray [AZELASTINE (ASTELIN) 137 MCG (0.1 %) NASAL SPRAY] as needed.       HEMP OIL OR EXTRACT OR OTHER CBD CANNABINOID, NOT MEDICAL CANNABIS, [HEMP OIL OR EXTRACT OR OTHER CBD CANNABINOID, NOT MEDICAL CANNABIS,]        nitroFURantoin macrocrystal (MACRODANTIN) 100 MG capsule Take 1 capsule (100 mg) by mouth 2 times daily 60 capsule 0     oxyCODONE (ROXICODONE) 5 MG tablet Take 1-2 tablets (5-10 mg) by mouth every 4 hours as needed for pain 20 tablet 0       Allergies   Allergen Reactions     Other Environmental Allergy Unknown     Seasonal allergies       Social History     Socioeconomic History     Marital status:      Spouse name: Not on file     Number of children: Not on file     Years of education: Not on file     Highest education level: Not on file   Occupational History     Not on file   Tobacco Use     Smoking status: Never Smoker     Smokeless tobacco: Never Used   Substance and Sexual Activity     Alcohol use: Yes     Comment: Alcoholic Drinks/day: very rare     Drug use: No      Sexual activity: Not on file   Other Topics Concern     Not on file   Social History Narrative     Not on file     Social Determinants of Health     Financial Resource Strain:      Difficulty of Paying Living Expenses:    Food Insecurity:      Worried About Running Out of Food in the Last Year:      Ran Out of Food in the Last Year:    Transportation Needs:      Lack of Transportation (Medical):      Lack of Transportation (Non-Medical):    Physical Activity:      Days of Exercise per Week:      Minutes of Exercise per Session:    Stress:      Feeling of Stress :    Social Connections:      Frequency of Communication with Friends and Family:      Frequency of Social Gatherings with Friends and Family:      Attends [employer] Services:      Active Member of Clubs or Organizations:      Attends Club or Organization Meetings:      Marital Status:    Intimate Partner Violence:      Fear of Current or Ex-Partner:      Emotionally Abused:      Physically Abused:      Sexually Abused:        Family History   Problem Relation Age of Onset     Urolithiasis Father      Heart Disease Father         mi     Diabetes Sister      Urolithiasis Cousin      Cancer No family hx of      Gout No family hx of      Clotting Disorder No family hx of        Objective:     No vitals or physical exam obtained due to virtual visit  Labs   Urinalysis POC (Office):  Nitrite Urine   Date Value Ref Range Status   11/19/2019 Negative Negative Final   10/17/2019 Negative Negative Final   10/06/2019 Negative Negative Final       Lab Urinalysis:  Nitrite Urine   Date Value Ref Range Status   11/19/2019 Negative Negative Final   10/17/2019 Negative Negative Final   10/06/2019 Negative Negative Final    and Acute Labs   CBC   WBC   Date Value Ref Range Status   02/26/2019 3.2 (L) 4.0 - 11.0 thou/uL Final   02/24/2019 3.2 (L) 4.0 - 11.0 thou/uL Final   02/23/2019 2.7 (L) 4.0 - 11.0 thou/uL Final     Hemoglobin   Date Value Ref Range Status    02/26/2019 9.3 (L) 12.0 - 16.0 g/dL Final   02/24/2019 9.4 (L) 12.0 - 16.0 g/dL Final   02/23/2019 10.0 (L) 12.0 - 16.0 g/dL Final     Platelet Count   Date Value Ref Range Status   02/26/2019 213 140 - 440 thou/uL Final   02/24/2019 194 140 - 440 thou/uL Final   02/23/2019 198 140 - 440 thou/uL Final   , Renal Panel  KSINo results found for: CREATININE and Urine Culture    Culture   Date Value Ref Range Status   10/13/2021 >100,000 CFU/mL Escherichia coli ESBL (A)  Final   08/23/2021 >100,000 CFU/mL Escherichia coli ESBL (A)  Final   07/23/2021 50,000-100,000 CFU/mL Escherichia coli ESBL (A)  Final

## 2021-10-27 ENCOUNTER — TELEPHONE (OUTPATIENT)
Dept: UROLOGY | Facility: CLINIC | Age: 71
End: 2021-10-27

## 2021-10-27 DIAGNOSIS — N20.0 CALCULUS OF KIDNEY: Primary | ICD-10-CM

## 2021-10-28 DIAGNOSIS — Z11.59 ENCOUNTER FOR SCREENING FOR OTHER VIRAL DISEASES: ICD-10-CM

## 2021-11-08 ENCOUNTER — LAB (OUTPATIENT)
Dept: FAMILY MEDICINE | Facility: CLINIC | Age: 71
End: 2021-11-08
Attending: UROLOGY
Payer: MEDICARE

## 2021-11-08 DIAGNOSIS — Z11.59 ENCOUNTER FOR SCREENING FOR OTHER VIRAL DISEASES: ICD-10-CM

## 2021-11-08 PROBLEM — E55.9 VITAMIN D DEFICIENCY: Status: ACTIVE | Noted: 2021-11-08

## 2021-11-08 PROBLEM — E04.1 THYROID NODULE: Status: ACTIVE | Noted: 2021-11-08

## 2021-11-08 PROBLEM — N20.0 KIDNEY STONE: Status: ACTIVE | Noted: 2021-11-08

## 2021-11-08 PROBLEM — M81.0 SENILE OSTEOPOROSIS: Status: ACTIVE | Noted: 2021-11-08

## 2021-11-08 PROBLEM — E83.52 HYPERCALCEMIA: Status: ACTIVE | Noted: 2021-11-08

## 2021-11-08 PROBLEM — J30.9 ALLERGIC RHINITIS: Status: ACTIVE | Noted: 2021-11-08

## 2021-11-08 PROBLEM — R91.1 PULMONARY NODULE: Status: ACTIVE | Noted: 2021-11-08

## 2021-11-08 PROBLEM — D50.9 IRON DEFICIENCY ANEMIA: Status: ACTIVE | Noted: 2021-11-08

## 2021-11-08 PROBLEM — E21.0 PRIMARY HYPERPARATHYROIDISM (H): Status: ACTIVE | Noted: 2021-11-08

## 2021-11-08 PROBLEM — N39.0 URINARY TRACT INFECTIOUS DISEASE: Status: ACTIVE | Noted: 2021-11-08

## 2021-11-08 PROBLEM — K62.9 RECTAL LESION: Status: ACTIVE | Noted: 2021-11-08

## 2021-11-08 PROBLEM — E46 MALNUTRITION, UNSPECIFIED TYPE (H): Status: ACTIVE | Noted: 2021-11-08

## 2021-11-08 PROBLEM — C21.0: Status: ACTIVE | Noted: 2021-11-08

## 2021-11-08 LAB — SARS-COV-2 RNA RESP QL NAA+PROBE: NEGATIVE

## 2021-11-08 PROCEDURE — U0005 INFEC AGEN DETEC AMPLI PROBE: HCPCS

## 2021-11-08 PROCEDURE — U0003 INFECTIOUS AGENT DETECTION BY NUCLEIC ACID (DNA OR RNA); SEVERE ACUTE RESPIRATORY SYNDROME CORONAVIRUS 2 (SARS-COV-2) (CORONAVIRUS DISEASE [COVID-19]), AMPLIFIED PROBE TECHNIQUE, MAKING USE OF HIGH THROUGHPUT TECHNOLOGIES AS DESCRIBED BY CMS-2020-01-R: HCPCS

## 2021-11-10 ASSESSMENT — MIFFLIN-ST. JEOR: SCORE: 1167.92

## 2021-11-11 ENCOUNTER — ANESTHESIA EVENT (OUTPATIENT)
Dept: SURGERY | Facility: AMBULATORY SURGERY CENTER | Age: 71
End: 2021-11-11
Payer: MEDICARE

## 2021-11-12 ENCOUNTER — HOSPITAL ENCOUNTER (OUTPATIENT)
Facility: AMBULATORY SURGERY CENTER | Age: 71
End: 2021-11-12
Attending: UROLOGY
Payer: MEDICARE

## 2021-11-12 ENCOUNTER — ANESTHESIA (OUTPATIENT)
Dept: SURGERY | Facility: AMBULATORY SURGERY CENTER | Age: 71
End: 2021-11-12
Payer: MEDICARE

## 2021-11-12 VITALS
OXYGEN SATURATION: 97 % | RESPIRATION RATE: 16 BRPM | HEART RATE: 53 BPM | TEMPERATURE: 97.2 F | SYSTOLIC BLOOD PRESSURE: 115 MMHG | HEIGHT: 65 IN | BODY MASS INDEX: 23.66 KG/M2 | WEIGHT: 142 LBS | DIASTOLIC BLOOD PRESSURE: 58 MMHG

## 2021-11-12 DIAGNOSIS — N20.0 CALCULUS OF KIDNEY: ICD-10-CM

## 2021-11-12 PROCEDURE — 87070 CULTURE OTHR SPECIMN AEROBIC: CPT | Performed by: UROLOGY

## 2021-11-12 PROCEDURE — 87075 CULTR BACTERIA EXCEPT BLOOD: CPT | Performed by: UROLOGY

## 2021-11-12 PROCEDURE — 87186 SC STD MICRODIL/AGAR DIL: CPT | Performed by: UROLOGY

## 2021-11-12 PROCEDURE — 82365 CALCULUS SPECTROSCOPY: CPT | Performed by: UROLOGY

## 2021-11-12 PROCEDURE — 99000 SPECIMEN HANDLING OFFICE-LAB: CPT | Performed by: UROLOGY

## 2021-11-12 PROCEDURE — 52356 CYSTO/URETERO W/LITHOTRIPSY: CPT | Mod: 22 | Performed by: UROLOGY

## 2021-11-12 PROCEDURE — 87077 CULTURE AEROBIC IDENTIFY: CPT | Performed by: UROLOGY

## 2021-11-12 DEVICE — URETERAL STENT
Type: IMPLANTABLE DEVICE | Site: URETER | Status: FUNCTIONAL
Brand: PERCUFLEX™ PLUS

## 2021-11-12 RX ORDER — HYDROMORPHONE HCL IN WATER/PF 6 MG/30 ML
0.2 PATIENT CONTROLLED ANALGESIA SYRINGE INTRAVENOUS EVERY 5 MIN PRN
Status: DISCONTINUED | OUTPATIENT
Start: 2021-11-12 | End: 2021-11-13 | Stop reason: HOSPADM

## 2021-11-12 RX ORDER — GENTAMICIN SULFATE 80 MG/100ML
80 INJECTION, SOLUTION INTRAVENOUS
Status: COMPLETED | OUTPATIENT
Start: 2021-11-12 | End: 2021-11-12

## 2021-11-12 RX ORDER — ACETAMINOPHEN 500 MG
1000 TABLET ORAL
Status: COMPLETED | OUTPATIENT
Start: 2021-11-12 | End: 2021-11-12

## 2021-11-12 RX ORDER — ONDANSETRON 2 MG/ML
INJECTION INTRAMUSCULAR; INTRAVENOUS PRN
Status: DISCONTINUED | OUTPATIENT
Start: 2021-11-12 | End: 2021-11-12

## 2021-11-12 RX ORDER — FENTANYL CITRATE 50 UG/ML
INJECTION, SOLUTION INTRAMUSCULAR; INTRAVENOUS PRN
Status: DISCONTINUED | OUTPATIENT
Start: 2021-11-12 | End: 2021-11-12

## 2021-11-12 RX ORDER — ONDANSETRON 4 MG/1
4 TABLET, ORALLY DISINTEGRATING ORAL EVERY 30 MIN PRN
Status: DISCONTINUED | OUTPATIENT
Start: 2021-11-12 | End: 2021-11-13 | Stop reason: HOSPADM

## 2021-11-12 RX ORDER — ACETAMINOPHEN 325 MG/1
975 TABLET ORAL ONCE
Status: DISCONTINUED | OUTPATIENT
Start: 2021-11-12 | End: 2021-11-13 | Stop reason: HOSPADM

## 2021-11-12 RX ORDER — PROPOFOL 10 MG/ML
INJECTION, EMULSION INTRAVENOUS CONTINUOUS PRN
Status: DISCONTINUED | OUTPATIENT
Start: 2021-11-12 | End: 2021-11-12

## 2021-11-12 RX ORDER — SODIUM CHLORIDE, SODIUM LACTATE, POTASSIUM CHLORIDE, CALCIUM CHLORIDE 600; 310; 30; 20 MG/100ML; MG/100ML; MG/100ML; MG/100ML
INJECTION, SOLUTION INTRAVENOUS CONTINUOUS
Status: DISCONTINUED | OUTPATIENT
Start: 2021-11-12 | End: 2021-11-13 | Stop reason: HOSPADM

## 2021-11-12 RX ORDER — OXYCODONE HYDROCHLORIDE 5 MG/1
5 TABLET ORAL EVERY 4 HOURS PRN
Status: DISCONTINUED | OUTPATIENT
Start: 2021-11-12 | End: 2021-11-13 | Stop reason: HOSPADM

## 2021-11-12 RX ORDER — KETOROLAC TROMETHAMINE 30 MG/ML
INJECTION, SOLUTION INTRAMUSCULAR; INTRAVENOUS PRN
Status: DISCONTINUED | OUTPATIENT
Start: 2021-11-12 | End: 2021-11-12

## 2021-11-12 RX ORDER — ONDANSETRON 2 MG/ML
4 INJECTION INTRAMUSCULAR; INTRAVENOUS EVERY 30 MIN PRN
Status: DISCONTINUED | OUTPATIENT
Start: 2021-11-12 | End: 2021-11-13 | Stop reason: HOSPADM

## 2021-11-12 RX ORDER — FENTANYL CITRATE 50 UG/ML
25 INJECTION, SOLUTION INTRAMUSCULAR; INTRAVENOUS
Status: DISCONTINUED | OUTPATIENT
Start: 2021-11-12 | End: 2021-11-13 | Stop reason: HOSPADM

## 2021-11-12 RX ORDER — PROPOFOL 10 MG/ML
INJECTION, EMULSION INTRAVENOUS PRN
Status: DISCONTINUED | OUTPATIENT
Start: 2021-11-12 | End: 2021-11-12

## 2021-11-12 RX ORDER — LIDOCAINE 40 MG/G
CREAM TOPICAL
Status: DISCONTINUED | OUTPATIENT
Start: 2021-11-12 | End: 2021-11-13 | Stop reason: HOSPADM

## 2021-11-12 RX ORDER — GABAPENTIN 300 MG/1
300 CAPSULE ORAL
Status: COMPLETED | OUTPATIENT
Start: 2021-11-12 | End: 2021-11-12

## 2021-11-12 RX ORDER — LIDOCAINE HYDROCHLORIDE 20 MG/ML
INJECTION, SOLUTION INFILTRATION; PERINEURAL PRN
Status: DISCONTINUED | OUTPATIENT
Start: 2021-11-12 | End: 2021-11-12

## 2021-11-12 RX ORDER — DEXAMETHASONE SODIUM PHOSPHATE 4 MG/ML
INJECTION, SOLUTION INTRA-ARTICULAR; INTRALESIONAL; INTRAMUSCULAR; INTRAVENOUS; SOFT TISSUE PRN
Status: DISCONTINUED | OUTPATIENT
Start: 2021-11-12 | End: 2021-11-12

## 2021-11-12 RX ORDER — FENTANYL CITRATE 50 UG/ML
25 INJECTION, SOLUTION INTRAMUSCULAR; INTRAVENOUS EVERY 5 MIN PRN
Status: DISCONTINUED | OUTPATIENT
Start: 2021-11-12 | End: 2021-11-13 | Stop reason: HOSPADM

## 2021-11-12 RX ADMIN — Medication 1000 MG: at 09:09

## 2021-11-12 RX ADMIN — ONDANSETRON 4 MG: 2 INJECTION INTRAMUSCULAR; INTRAVENOUS at 09:46

## 2021-11-12 RX ADMIN — DEXAMETHASONE SODIUM PHOSPHATE 10 MG: 4 INJECTION, SOLUTION INTRA-ARTICULAR; INTRALESIONAL; INTRAMUSCULAR; INTRAVENOUS; SOFT TISSUE at 09:34

## 2021-11-12 RX ADMIN — FENTANYL CITRATE 50 MCG: 50 INJECTION, SOLUTION INTRAMUSCULAR; INTRAVENOUS at 09:46

## 2021-11-12 RX ADMIN — SODIUM CHLORIDE, SODIUM LACTATE, POTASSIUM CHLORIDE, CALCIUM CHLORIDE: 600; 310; 30; 20 INJECTION, SOLUTION INTRAVENOUS at 09:20

## 2021-11-12 RX ADMIN — FENTANYL CITRATE 50 MCG: 50 INJECTION, SOLUTION INTRAMUSCULAR; INTRAVENOUS at 09:34

## 2021-11-12 RX ADMIN — GENTAMICIN SULFATE 80 MG: 80 INJECTION, SOLUTION INTRAVENOUS at 09:20

## 2021-11-12 RX ADMIN — GABAPENTIN 300 MG: 300 CAPSULE ORAL at 09:10

## 2021-11-12 RX ADMIN — PROPOFOL 150 MG: 10 INJECTION, EMULSION INTRAVENOUS at 09:34

## 2021-11-12 RX ADMIN — LIDOCAINE HYDROCHLORIDE 60 MG: 20 INJECTION, SOLUTION INFILTRATION; PERINEURAL at 09:34

## 2021-11-12 RX ADMIN — Medication 30 MG: at 09:35

## 2021-11-12 RX ADMIN — PROPOFOL 200 MCG/KG/MIN: 10 INJECTION, EMULSION INTRAVENOUS at 09:35

## 2021-11-12 RX ADMIN — KETOROLAC TROMETHAMINE 15 MG: 30 INJECTION, SOLUTION INTRAMUSCULAR; INTRAVENOUS at 10:25

## 2021-11-12 ASSESSMENT — MIFFLIN-ST. JEOR: SCORE: 1159.99

## 2021-11-12 NOTE — ANESTHESIA POSTPROCEDURE EVALUATION
Patient: Rosmery Heard    Procedure: Procedure(s):  CYSTOURETEROSCOPY, WITH LASER LITHOTRIPSY, CALCULUS REMOVAL AND URETERAL STENT INSERTION       Diagnosis:Calculus of kidney [N20.0]  Diagnosis Additional Information: No value filed.    Anesthesia Type:  General    Note:  Disposition: Outpatient   Postop Pain Control: Uneventful            Sign Out: Well controlled pain   PONV: No   Neuro/Psych: Uneventful            Sign Out: Acceptable/Baseline neuro status   Airway/Respiratory: Uneventful            Sign Out: Acceptable/Baseline resp. status   CV/Hemodynamics: Uneventful            Sign Out: Acceptable CV status; No obvious hypovolemia; No obvious fluid overload   Other NRE: NONE   DID A NON-ROUTINE EVENT OCCUR? No           Last vitals:  Vitals Value Taken Time   /54 11/12/21 1047   Temp 97.2  F (36.2  C) 11/12/21 1031   Pulse 63 11/12/21 1040   Resp 16 11/12/21 1047   SpO2 95 % 11/12/21 1047       Electronically Signed By: Santos Hyatt MD  November 12, 2021  12:28 PM

## 2021-11-12 NOTE — DISCHARGE INSTRUCTIONS
You received 1000 mg of acetaminophen (Tylenol) at 9:09am. Do not exceed 4,000 mg of acetaminophen during a 24 hour period and keep in mind that acetaminophen can also be found in many over-the-counter cold medications as well as narcotics that may be given for pain.     You received a dose of IV Toradol at 10:30 AM. Therefore, please do not take any additional Ibuprofen products (Aleve/Advil/Motrin/Naproxen/Celebrex) until after 4:30 PM.    Going Home With a Ureteral Stent    What is it?  A stent is a soft, plastic tube that helps urine (pee) drain into the bladder.  During the surgery, it is placed in the ureter the tube that connects the kidney to the bladder.  A thin curl at each end of the stent keeps one end in your kidney and the other in your bladder.  The stent can not be seen from outside of the body.    Why Do I Have It?  Some sort of blockage is not letting pee drain into your bladder.  This could be from a stone, certain surgeries or kidney infection.    What Should I Expect?   Stents often cause some discomfort.  You may have:    The need to pee suddenly    Pain when you pee    A dull backache, which may get worse when you pee  Blood in your pee (color of fruit punch) and some clots, which may increase with physical activity.     What Can I Do To Feel Better?    Drink a little more fluids than usual.  You can eat your normal diet.    Enjoy a warm bath.  Decrease your activity.  Some people find bending or twisting movement cause discomfort or increased blood in the urine.  Even so, you will not harm yourself.                                                        [facility]                                                        Stent Removal With String    -Take Tylenol or Ibuprofen and drink fluids 1 hour prior to removing your stent at home.    -Remove the stent in the morning on the specific day as directed by your doctor.  Gently pull on the string in the shower while urinating until  "the stent is completely removed.    -Call [facility] if you have questions or concerns [phone]    -What To Expect After Your Stent Is Removed    -After stent removal, urine may be bloody and possibly have some bloody clots.    -You may experience an \"achy\" pain due to urethral spasms.  This generally only lasts a few hours, but should resolve over the next 2-3 days    "

## 2021-11-12 NOTE — OP NOTE
[facility]  [facility] [place] Operative Note    Rosmery Heard   November 12, 2021 11/12/2021   Pratibha Irene     Procedure Performed  Procedure(s):  CYSTOURETEROSCOPY, WITH LASER LITHOTRIPSY, CALCULUS REMOVAL AND URETERAL STENT INSERTION  1. Cystoscopy  2. Retrograde Pyelography - Left  3. Ureteroscopic Laser Lithotripsy - Left Kidney    4. Ureteral Stent Insertion - Left      Pre-operative Diagnosis  Calculus of kidney [N20.0]    Post-operative Diagnosis  1. Stone Left Kidney Lower Pole      Surgeon(s) and Role:     * Aayush Rider MD - Primary    Anesthesia Type  Not documented     Procedural Summary  Increased procedural complexity because of large impacted lower pole branched stone requiring 3 times expected time to perform procedure  Estimation of stone clearance: <2 mm residual  Subjective stone composition: calcium  Renal papillae assessment: plaques mild  Unanticipated event/findings: none  Post-operative plan: remove own stent in 6 days with extraction string return to clinic in 1 month with CT scan and urine culture.    Narrative    Challenging clearance of large branched lower pole stone in prolonged procedure. Stone samples sent for analysis and culture. There were diffuse signs of chronic inflammation (pyelitis cystica) in keeping with chronic infection.    Procedural Details    Patient is brought to the surgical suite where anesthesia is induced. she is prepped and draped in standard fashion in combined Trendelenberg and lithotomy position.    Cystoscopy: Rigid cystoscopy is performed. Introitus is normal. Bladder is normal..     Retrograde Pyelography:  imaging demonstrates radio-opaque renal stone consistent with pre-operative imaging. Left retrograde pyelography demonstrates radio-opaque renal pelvic stone without obstruction.     Ureteral Access: Left ureteral access is initiated with Sensor wire. Guide wire access to the kidney is assured. 8F dilator is inserted with  minimal resistance. 10F dilator is inserted with minimal resistance. 11F ureteral access sheath obturator is inserted with minimal resistance. 13F 28 cm ureteral access sheath is inserted with minimal resistance.      Flexible Ureteroscopy: Flexible ureteroscope is passed to kidney. Left lower pole stones are identified. Stones are severely impacted. Smaller stones are extracted intact. In situ laser lithotripsy is performed with mild generation of fine debris. Larger stones are translocated to dependent renal upper pole calyx where laser lithotripsy is performed with moderate generation of fine debris. The thulium laser is employed to reduce fragments to smallest possible dimensions.     Ureteral Stent Insertion: Left 7F 24 cm stent is inserted with good coil in kidney and bladder under fluoroscopic guidance. Stent extraction string left dangling from urethra.      The patient was then taken to the recovery room in good condition.     Past Medical History:   Diagnosis Date     Cancer (H)     anal     Kidney stone      UTI (urinary tract infection) 05/2021        Patient Active Problem List   Diagnosis     Urinary tract infection     Calculus of ureter     Calculus of kidney     Allergic rhinitis     Hypercalcemia     Iron deficiency anemia     Malignant tumor of anus (H)     Malnutrition, unspecified type (H)     Primary hyperparathyroidism (H)     Pulmonary nodule     Rectal lesion     Senile osteoporosis     Thyroid nodule     Vitamin D deficiency     Kidney stone     Urinary tract infectious disease        Estimated Blood Loss  .* No values recorded between 11/12/2021  9:45 AM and 11/12/2021 10:33 AM *     ID Type Source Tests Collected by Time Destination   A :  Calculus/Stone Kidney, Left STONE ANALYSIS Aayush Rider MD 11/12/2021 10:02 AM    B :  Calculus/Stone Kidney, Right ANAEROBIC BACTERIAL CULTURE ROUTINE, AEROBIC BACTERIAL CULTURE ROUTINE Aayush Rider MD 11/12/2021 10:04 AM

## 2021-11-12 NOTE — ANESTHESIA CARE TRANSFER NOTE
Patient: Rosmery Heard    Procedure: Procedure(s):  CYSTOURETEROSCOPY, WITH LASER LITHOTRIPSY, CALCULUS REMOVAL AND URETERAL STENT INSERTION       Diagnosis: Calculus of kidney [N20.0]  Diagnosis Additional Information: No value filed.    Anesthesia Type:   General     Note:    Oropharynx: oropharynx clear of all foreign objects and spontaneously breathing  Level of Consciousness: drowsy  Oxygen Supplementation: face mask  Level of Supplemental Oxygen (L/min / FiO2): 5  Independent Airway: airway patency satisfactory and stable  Dentition: dentition unchanged  Vital Signs Stable: post-procedure vital signs reviewed and stable  Report to RN Given: handoff report given  Patient transferred to: PACU    Handoff Report: Identifed the Patient, Identified the Reponsible Provider, Reviewed the pertinent medical history, Discussed the surgical course, Reviewed Intra-OP anesthesia mangement and issues during anesthesia, Set expectations for post-procedure period and Allowed opportunity for questions and acknowledgement of understanding      Vitals:  Vitals Value Taken Time   /56 11/12/21 1031   Temp 97.2  F (36.2  C) 11/12/21 1031   Pulse 56 11/12/21 1031   Resp 16 11/12/21 1031   SpO2 99 % 11/12/21 1031       Electronically Signed By: PAULA Jack CRNA  November 12, 2021  10:35 AM

## 2021-11-12 NOTE — ANESTHESIA PROCEDURE NOTES
Airway       Patient location during procedure: OR       Procedure Start/Stop Times: 11/12/2021 9:37 AM  Staff -        Anesthesiologist:  Santos Hyatt MD       CRNA: Rose Marie Killian APRN CRNA       Performed By: CRNA  Consent for Airway        Urgency: elective  Indications and Patient Condition       Indications for airway management: basil-procedural       Induction type:intravenous       Mask difficulty assessment: 1 - vent by mask    Final Airway Details       Final airway type: endotracheal airway       Successful airway: ETT - single  Endotracheal Airway Details        ETT size (mm): 7.0       Cuffed: yes       Successful intubation technique: direct laryngoscopy       DL Blade Type: Marquez 2       Grade View of Cords: 1       Adjucts: stylet and tooth guard       Position: Right       Measured from: gums/teeth       Secured at (cm): 20       Bite block used: None    Post intubation assessment        Placement verified by: capnometry, equal breath sounds and chest rise        Number of attempts at approach: 1       Secured with: silk tape       Ease of procedure: easy       Dentition: Intact and Unchanged

## 2021-11-12 NOTE — ANESTHESIA PREPROCEDURE EVALUATION
Anesthesia Pre-Procedure Evaluation    Patient: Rosmery Heard   MRN: [id number] : 1950        Preoperative Diagnosis: Calculus of kidney [N20.0]    Procedure : Procedure(s):  CYSTOURETEROSCOPY, WITH LASER LITHOTRIPSY, CALCULUS REMOVAL AND URETERAL STENT INSERTION          Past Medical History:   Diagnosis Date     Cancer (H)     anal     Kidney stone      UTI (urinary tract infection) 2021      Past Surgical History:   Procedure Laterality Date     BIOPSY VULVA N/A 2018    Procedure: EUA BIOPSY ANAL MASS;  Surgeon: Blaire Ashby MD;  Location: [facility];  Service: General     COMBINED CYSTOSCOPY, INSERT STENT URETER(S) Right 10/11/2019    Procedure: RIGHT CYSTOURETEROSCOPY WITH BASKET STONE EXTRACTION  AND URETERAL STENT INSERTION;  Surgeon: Aayush Rider MD;  Location: [facility];  Service: Urology     DILATION AND CURETTAGE        Allergies   Allergen Reactions     Cocoa      Other reaction(s): Other (see comments)  Nasal congestion     Grass Extracts [Gramineae Pollens]      Other reaction(s): Other (see comments)  Sneezing, runny nose     Other Environmental Allergy Unknown     Seasonal allergies      Social History     Tobacco Use     Smoking status: Never Smoker     Smokeless tobacco: Never Used   Substance Use Topics     Alcohol use: Yes     Comment: Alcoholic Drinks/day: very rare      Wt Readings from Last 1 Encounters:   11/10/21 64.4 kg (142 lb)        Anesthesia Evaluation   Pt has had prior anesthetic. Type: General.    No history of anesthetic complications       ROS/MED HX  ENT/Pulmonary:  - neg pulmonary ROS     Neurologic:  - neg neurologic ROS     Cardiovascular:  - neg cardiovascular ROS  (-) murmur and wheezes   METS/Exercise Tolerance: >4 METS    Hematologic:  - neg hematologic  ROS     Musculoskeletal:  - neg musculoskeletal ROS     GI/Hepatic:  - neg GI/hepatic ROS     Renal/Genitourinary:     (+) renal disease, Nephrolithiasis ,     Endo:     (+)  thyroid problem,     Psychiatric/Substance Use:  - neg psychiatric ROS     Infectious Disease:  - neg infectious disease ROS     Malignancy:  - neg malignancy ROS     Other:  - neg other ROS          Physical Exam    Airway  airway exam normal      Mallampati: II   TM distance: > 3 FB   Neck ROM: full   Mouth opening: > 3 cm    Respiratory Devices and Support         Dental  no notable dental history         Cardiovascular          Rhythm and rate: regular and normal (-) no murmur    Pulmonary           breath sounds clear to auscultation   (-) no wheezes        OUTSIDE LABS:  CBC:   Lab Results   Component Value Date    WBC 3.2 (L) 02/26/2019    WBC 3.2 (L) 02/24/2019    HGB 9.3 (L) 02/26/2019    HGB 9.4 (L) 02/24/2019    HCT 27.3 (L) 02/26/2019    HCT 27.1 (L) 02/24/2019     02/26/2019     02/24/2019     BMP:   Lab Results   Component Value Date     02/26/2019     02/24/2019    POTASSIUM 4.0 02/28/2019    POTASSIUM 3.5 02/28/2019    CHLORIDE 109 (H) 02/26/2019    CHLORIDE 111 (H) 02/24/2019    CO2 24 02/26/2019    CO2 25 02/24/2019    BUN 12 02/26/2019    BUN 15 02/24/2019    CR 0.71 11/19/2019    CR 0.49 (L) 02/26/2019     02/26/2019     02/24/2019     COAGS: No results found for: PTT, INR, FIBR  POC: No results found for: BGM, HCG, HCGS  HEPATIC:   Lab Results   Component Value Date    ALBUMIN 2.2 (L) 02/23/2019    PROTTOTAL 5.0 (L) 02/23/2019    ALT 18 02/23/2019    AST 19 02/23/2019    ALKPHOS 56 02/23/2019    BILITOTAL 0.8 02/23/2019     OTHER:   Lab Results   Component Value Date    PH 7.37 11/19/2019    LACT 1.2 02/18/2019    ANASTASIA 10.3 11/19/2019    PHOS 3.3 11/19/2019    MAG 1.8 02/23/2019    LIPASE 35 02/24/2019    TSH 1.71 12/13/2019       Anesthesia Plan    ASA Status:  1   NPO Status:  NPO Appropriate    Anesthesia Type: General.     - Airway: ETT   Induction: Intravenous, Propofol.   Maintenance: TIVA.        Consents    Anesthesia Plan(s) and associated risks,  benefits, and realistic alternatives discussed. Questions answered and patient/representative(s) expressed understanding.     - Discussed with:  Patient      - Patient is DNR/DNI Status: No         Postoperative Care    Pain management: IV analgesics, Oral pain medications, Multi-modal analgesia.   PONV prophylaxis: Ondansetron (or other 5HT-3), Dexamethasone or Solumedrol     Comments:                Santos Hyatt MD

## 2021-11-13 PROCEDURE — 99285 EMERGENCY DEPT VISIT HI MDM: CPT | Mod: 25

## 2021-11-13 ASSESSMENT — MIFFLIN-ST. JEOR: SCORE: 1162.88

## 2021-11-14 ENCOUNTER — HOSPITAL ENCOUNTER (INPATIENT)
Facility: HOSPITAL | Age: 71
LOS: 3 days | Discharge: HOME | DRG: 690 | End: 2021-11-17
Attending: EMERGENCY MEDICINE | Admitting: STUDENT IN AN ORGANIZED HEALTH CARE EDUCATION/TRAINING PROGRAM
Payer: MEDICARE

## 2021-11-14 ENCOUNTER — APPOINTMENT (OUTPATIENT)
Dept: CT IMAGING | Facility: HOSPITAL | Age: 71
End: 2021-11-14
Attending: EMERGENCY MEDICINE
Payer: MEDICARE

## 2021-11-14 DIAGNOSIS — N39.0 URINARY TRACT INFECTION WITH HEMATURIA, SITE UNSPECIFIED: ICD-10-CM

## 2021-11-14 DIAGNOSIS — R31.9 URINARY TRACT INFECTION WITH HEMATURIA, SITE UNSPECIFIED: ICD-10-CM

## 2021-11-14 DIAGNOSIS — N30.01 ACUTE CYSTITIS WITH HEMATURIA: Primary | ICD-10-CM

## 2021-11-14 LAB
ALBUMIN SERPL-MCNC: 3.9 G/DL (ref 3.5–5)
ALBUMIN UR-MCNC: 200 MG/DL
ALP SERPL-CCNC: 61 U/L (ref 45–120)
ALT SERPL W P-5'-P-CCNC: 24 U/L (ref 0–45)
ANION GAP SERPL CALCULATED.3IONS-SCNC: 7 MMOL/L (ref 5–18)
APPEARANCE UR: ABNORMAL
AST SERPL W P-5'-P-CCNC: 39 U/L (ref 0–40)
BACTERIA #/AREA URNS HPF: ABNORMAL /HPF
BILIRUB SERPL-MCNC: 0.4 MG/DL (ref 0–1)
BILIRUB UR QL STRIP: NEGATIVE
BUN SERPL-MCNC: 19 MG/DL (ref 8–28)
C REACTIVE PROTEIN LHE: 3.8 MG/DL (ref 0–0.8)
CALCIUM SERPL-MCNC: 9.4 MG/DL (ref 8.5–10.5)
CHLORIDE BLD-SCNC: 108 MMOL/L (ref 98–107)
CO2 SERPL-SCNC: 26 MMOL/L (ref 22–31)
COLOR UR AUTO: ABNORMAL
CREAT SERPL-MCNC: 0.82 MG/DL (ref 0.6–1.1)
ERYTHROCYTE [DISTWIDTH] IN BLOOD BY AUTOMATED COUNT: 12.4 % (ref 10–15)
GFR SERPL CREATININE-BSD FRML MDRD: 72 ML/MIN/1.73M2
GLUCOSE BLD-MCNC: 124 MG/DL (ref 70–125)
GLUCOSE UR STRIP-MCNC: NEGATIVE MG/DL
HCT VFR BLD AUTO: 38.4 % (ref 35–47)
HGB BLD-MCNC: 12.6 G/DL (ref 11.7–15.7)
HGB UR QL STRIP: ABNORMAL
KETONES UR STRIP-MCNC: NEGATIVE MG/DL
LACTATE SERPL-SCNC: 1.5 MMOL/L (ref 0.7–2)
LEUKOCYTE ESTERASE UR QL STRIP: ABNORMAL
MCH RBC QN AUTO: 32.1 PG (ref 26.5–33)
MCHC RBC AUTO-ENTMCNC: 32.8 G/DL (ref 31.5–36.5)
MCV RBC AUTO: 98 FL (ref 78–100)
MUCOUS THREADS #/AREA URNS LPF: PRESENT /LPF
NITRATE UR QL: NEGATIVE
PH UR STRIP: 6 [PH] (ref 5–7)
PLATELET # BLD AUTO: 207 10E3/UL (ref 150–450)
POTASSIUM BLD-SCNC: 3.7 MMOL/L (ref 3.5–5)
PROT SERPL-MCNC: 7.4 G/DL (ref 6–8)
RBC # BLD AUTO: 3.92 10E6/UL (ref 3.8–5.2)
RBC URINE: >182 /HPF
SARS-COV-2 RNA RESP QL NAA+PROBE: NEGATIVE
SODIUM SERPL-SCNC: 141 MMOL/L (ref 136–145)
SP GR UR STRIP: 1.02 (ref 1–1.03)
UROBILINOGEN UR STRIP-MCNC: <2 MG/DL
WBC # BLD AUTO: 10 10E3/UL (ref 4–11)
WBC URINE: 101 /HPF

## 2021-11-14 PROCEDURE — 36415 COLL VENOUS BLD VENIPUNCTURE: CPT | Performed by: EMERGENCY MEDICINE

## 2021-11-14 PROCEDURE — 96365 THER/PROPH/DIAG IV INF INIT: CPT

## 2021-11-14 PROCEDURE — 87635 SARS-COV-2 COVID-19 AMP PRB: CPT | Performed by: EMERGENCY MEDICINE

## 2021-11-14 PROCEDURE — 99221 1ST HOSP IP/OBS SF/LOW 40: CPT | Performed by: UROLOGY

## 2021-11-14 PROCEDURE — 87086 URINE CULTURE/COLONY COUNT: CPT | Performed by: EMERGENCY MEDICINE

## 2021-11-14 PROCEDURE — 80053 COMPREHEN METABOLIC PANEL: CPT | Performed by: EMERGENCY MEDICINE

## 2021-11-14 PROCEDURE — C9803 HOPD COVID-19 SPEC COLLECT: HCPCS

## 2021-11-14 PROCEDURE — 120N000001 HC R&B MED SURG/OB

## 2021-11-14 PROCEDURE — 81001 URINALYSIS AUTO W/SCOPE: CPT | Performed by: EMERGENCY MEDICINE

## 2021-11-14 PROCEDURE — 99222 1ST HOSP IP/OBS MODERATE 55: CPT | Performed by: INTERNAL MEDICINE

## 2021-11-14 PROCEDURE — 99222 1ST HOSP IP/OBS MODERATE 55: CPT | Mod: AI | Performed by: STUDENT IN AN ORGANIZED HEALTH CARE EDUCATION/TRAINING PROGRAM

## 2021-11-14 PROCEDURE — 250N000013 HC RX MED GY IP 250 OP 250 PS 637: Performed by: STUDENT IN AN ORGANIZED HEALTH CARE EDUCATION/TRAINING PROGRAM

## 2021-11-14 PROCEDURE — 85018 HEMOGLOBIN: CPT | Performed by: EMERGENCY MEDICINE

## 2021-11-14 PROCEDURE — 258N000003 HC RX IP 258 OP 636: Performed by: EMERGENCY MEDICINE

## 2021-11-14 PROCEDURE — 87040 BLOOD CULTURE FOR BACTERIA: CPT | Performed by: EMERGENCY MEDICINE

## 2021-11-14 PROCEDURE — 96361 HYDRATE IV INFUSION ADD-ON: CPT

## 2021-11-14 PROCEDURE — 250N000011 HC RX IP 250 OP 636: Performed by: EMERGENCY MEDICINE

## 2021-11-14 PROCEDURE — 86141 C-REACTIVE PROTEIN HS: CPT | Performed by: EMERGENCY MEDICINE

## 2021-11-14 PROCEDURE — 74176 CT ABD & PELVIS W/O CONTRAST: CPT

## 2021-11-14 PROCEDURE — 250N000011 HC RX IP 250 OP 636: Performed by: STUDENT IN AN ORGANIZED HEALTH CARE EDUCATION/TRAINING PROGRAM

## 2021-11-14 PROCEDURE — 83605 ASSAY OF LACTIC ACID: CPT | Performed by: EMERGENCY MEDICINE

## 2021-11-14 RX ORDER — ONDANSETRON 2 MG/ML
4 INJECTION INTRAMUSCULAR; INTRAVENOUS EVERY 6 HOURS PRN
Status: DISCONTINUED | OUTPATIENT
Start: 2021-11-14 | End: 2021-11-17 | Stop reason: HOSPADM

## 2021-11-14 RX ORDER — ONDANSETRON 4 MG/1
4 TABLET, ORALLY DISINTEGRATING ORAL EVERY 6 HOURS PRN
Status: DISCONTINUED | OUTPATIENT
Start: 2021-11-14 | End: 2021-11-17 | Stop reason: HOSPADM

## 2021-11-14 RX ORDER — LANOLIN ALCOHOL/MO/W.PET/CERES
3 CREAM (GRAM) TOPICAL
Status: DISCONTINUED | OUTPATIENT
Start: 2021-11-14 | End: 2021-11-14

## 2021-11-14 RX ORDER — ACETAMINOPHEN 325 MG/1
650 TABLET ORAL EVERY 4 HOURS PRN
Status: DISCONTINUED | OUTPATIENT
Start: 2021-11-14 | End: 2021-11-17 | Stop reason: HOSPADM

## 2021-11-14 RX ORDER — MEROPENEM 1 G/1
1 INJECTION, POWDER, FOR SOLUTION INTRAVENOUS EVERY 8 HOURS
Status: DISCONTINUED | OUTPATIENT
Start: 2021-11-14 | End: 2021-11-17

## 2021-11-14 RX ORDER — CALCIUM CARBONATE 500 MG/1
500 TABLET, CHEWABLE ORAL 3 TIMES DAILY PRN
Status: DISCONTINUED | OUTPATIENT
Start: 2021-11-14 | End: 2021-11-17 | Stop reason: HOSPADM

## 2021-11-14 RX ORDER — HYDRALAZINE HYDROCHLORIDE 20 MG/ML
10 INJECTION INTRAMUSCULAR; INTRAVENOUS EVERY 4 HOURS PRN
Status: DISCONTINUED | OUTPATIENT
Start: 2021-11-14 | End: 2021-11-17 | Stop reason: HOSPADM

## 2021-11-14 RX ORDER — LANOLIN ALCOHOL/MO/W.PET/CERES
3 CREAM (GRAM) TOPICAL
Status: DISCONTINUED | OUTPATIENT
Start: 2021-11-14 | End: 2021-11-17 | Stop reason: HOSPADM

## 2021-11-14 RX ORDER — IBUPROFEN 200 MG
600 TABLET ORAL EVERY 6 HOURS PRN
Status: ON HOLD | COMMUNITY
End: 2021-11-17

## 2021-11-14 RX ORDER — OXYCODONE HYDROCHLORIDE 5 MG/1
5-10 TABLET ORAL EVERY 4 HOURS PRN
Status: DISCONTINUED | OUTPATIENT
Start: 2021-11-14 | End: 2021-11-17 | Stop reason: HOSPADM

## 2021-11-14 RX ADMIN — ACETAMINOPHEN 650 MG: 325 TABLET ORAL at 16:43

## 2021-11-14 RX ADMIN — MEROPENEM 1 G: 1 INJECTION, POWDER, FOR SOLUTION INTRAVENOUS at 11:34

## 2021-11-14 RX ADMIN — SODIUM CHLORIDE 1000 ML: 9 INJECTION, SOLUTION INTRAVENOUS at 03:31

## 2021-11-14 RX ADMIN — MEROPENEM 2 G: 1 INJECTION, POWDER, FOR SOLUTION INTRAVENOUS at 02:40

## 2021-11-14 RX ADMIN — MEROPENEM 1 G: 1 INJECTION, POWDER, FOR SOLUTION INTRAVENOUS at 19:40

## 2021-11-14 RX ADMIN — ACETAMINOPHEN 650 MG: 325 TABLET ORAL at 09:01

## 2021-11-14 RX ADMIN — SODIUM CHLORIDE 1000 ML: 9 INJECTION, SOLUTION INTRAVENOUS at 02:09

## 2021-11-14 ASSESSMENT — ENCOUNTER SYMPTOMS
MYALGIAS: 1
CHILLS: 1
FREQUENCY: 1
VOMITING: 0
HEADACHES: 0
FEVER: 1
DIFFICULTY URINATING: 1
COUGH: 0
CONFUSION: 0
FLANK PAIN: 0

## 2021-11-14 ASSESSMENT — ACTIVITIES OF DAILY LIVING (ADL)
ADLS_ACUITY_SCORE: 5
DEPENDENT_IADLS:: INDEPENDENT
ADLS_ACUITY_SCORE: 4
ADLS_ACUITY_SCORE: 4
ADLS_ACUITY_SCORE: 5
ADLS_ACUITY_SCORE: 4
ADLS_ACUITY_SCORE: 5
ADLS_ACUITY_SCORE: 4
ADLS_ACUITY_SCORE: 5
ADLS_ACUITY_SCORE: 4
ADLS_ACUITY_SCORE: 5
ADLS_ACUITY_SCORE: 5
ADLS_ACUITY_SCORE: 4
ADLS_ACUITY_SCORE: 5
ADLS_ACUITY_SCORE: 4
ADLS_ACUITY_SCORE: 5

## 2021-11-14 ASSESSMENT — MIFFLIN-ST. JEOR
SCORE: 1180.16
SCORE: 1188.1

## 2021-11-14 NOTE — ED TRIAGE NOTES
"Pt had left kidney stone removal yesterday and pt \"having chills\", possible temp. Pt has been on macrobid x 6 months for UTI. Pt has been taking ibuprofen 600 mg, last dose 4 pm.  "

## 2021-11-14 NOTE — H&P
ADMISSION HISTORY & PHYSICAL        Patient YOB: 1950  Admit date: 11/14/2021  Date of Service: 11/14/2021    ASSESSMENT AND PLAN:    71-year-old female with history of kidney stone status post lithotripsy and left ureteral stenting on 11/12/2021 with Dr. Rider presents with fever and chills and found to have UTI      Complicated UTI  -Urinalysis with pyuria and RBCs, CT abdomen left ureteral stent in place no hydronephrosis small bilateral intrarenal stones and trace fluid in the pelvis.  -History of ESBL in the past and, on Macrobid chronically.   -Will start on meropenem, follow urine culture and blood culture  -Urology consult    History of hyperparathyroidism  - Follows at [facility], had parathyroidectomy        CODE STATUS:Full code   DVT proph Lovenox         Disposition:  -Anticipated Length of Stay in midnights and medical necessity (including a midnight in the Emergency Department after triage if applicable):       CHIEF COMPLAINT:  Fever and chills    HISTORY OF PRESENTING ILLNESS:  Patient is a 71 year old female with PMH significant for recurrent previous urinary tract infections kidney stones and hyperparathyroidism who presents with fever and chills after having undergone stent placement on 11/12/2021 by Dr. Rider.  Denies any urinary symptoms, abdominal pain or flank pain.  Work-up here with urinalysis suggestive of UTI.  She was on chronic Macrobid prophylaxis for recurrent UTIs and had history of ESBL.  Urology was consulted from ED and patient was admitted for further care.  PMH/PSH:  Patient Active Problem List   Diagnosis     Urinary tract infection     Calculus of ureter     Calculus of kidney     Allergic rhinitis     Hypercalcemia     Iron deficiency anemia     Malignant tumor of anus (H)     Malnutrition, unspecified type (H)     Primary hyperparathyroidism (H)     Pulmonary nodule     Rectal lesion     Senile osteoporosis     Thyroid nodule     Vitamin D deficiency      Kidney stone     Urinary tract infectious disease       Past Medical History:   Diagnosis Date     Cancer (H)     anal     Kidney stone      UTI (urinary tract infection) 05/2021        Past Surgical History:   Procedure Laterality Date     BIOPSY VULVA N/A 12/20/2018    Procedure: EUA BIOPSY ANAL MASS;  Surgeon: Bliare Ashby MD;  Location: [facility];  Service: General     COMBINED CYSTOSCOPY, INSERT STENT URETER(S) Right 10/11/2019    Procedure: RIGHT CYSTOURETEROSCOPY WITH BASKET STONE EXTRACTION  AND URETERAL STENT INSERTION;  Surgeon: Aayush Rider MD;  Location: [facility];  Service: Urology     DILATION AND CURETTAGE            ALLERGIES:  Allergies   Allergen Reactions     Cocoa      Other reaction(s): Other (see comments)  Nasal congestion     Grass Extracts [Gramineae Pollens]      Other reaction(s): Other (see comments)  Sneezing, runny nose     Other Environmental Allergy Unknown     Seasonal allergies       MEDICATIONS:  Reviewed.  Current Facility-Administered Medications   Medication     gentamicin (GARAMYCIN) 80 mg in sterile water (bottle) 3,000 mL irrigation     Current Outpatient Medications   Medication     azelastine (ASTELIN) 137 mcg (0.1 %) nasal spray     HEMP OIL OR EXTRACT OR OTHER CBD CANNABINOID, NOT MEDICAL CANNABIS,     nitroFURantoin macrocrystal (MACRODANTIN) 100 MG capsule     oxyCODONE (ROXICODONE) 5 MG tablet       Current Facility-Administered Medications:      gentamicin (GARAMYCIN) 80 mg in sterile water (bottle) 3,000 mL irrigation, , Irrigation, Continuous, Aayush Rider MD    Current Outpatient Medications:      azelastine (ASTELIN) 137 mcg (0.1 %) nasal spray, [AZELASTINE (ASTELIN) 137 MCG (0.1 %) NASAL SPRAY] as needed., Disp: , Rfl:      HEMP OIL OR EXTRACT OR OTHER CBD CANNABINOID, NOT MEDICAL CANNABIS,, [HEMP OIL OR EXTRACT OR OTHER CBD CANNABINOID, NOT MEDICAL CANNABIS,] , Disp: , Rfl:      nitroFURantoin macrocrystal (MACRODANTIN) 100 MG  "capsule, Take 1 capsule (100 mg) by mouth 2 times daily, Disp: 60 capsule, Rfl: 0     oxyCODONE (ROXICODONE) 5 MG tablet, Take 1-2 tablets (5-10 mg) by mouth every 4 hours as needed for pain, Disp: 20 tablet, Rfl: 0   Scheduled Meds:  Continuous Infusions:    irrigation builder (choose ingredients)       PRN Meds:.    SOCIAL HISTORY:  Social History     Socioeconomic History     Marital status:      Spouse name: Not on file     Number of children: Not on file     Years of education: Not on file     Highest education level: Not on file   Occupational History     Not on file   Tobacco Use     Smoking status: Never Smoker     Smokeless tobacco: Never Used   Substance and Sexual Activity     Alcohol use: Yes     Comment: Alcoholic Drinks/day: very rare     Drug use: No     Sexual activity: Not on file   Other Topics Concern     Not on file   Social History Narrative     Not on file     Social Determinants of Health     Financial Resource Strain: Not on file   Food Insecurity: Not on file   Transportation Needs: Not on file   Physical Activity: Not on file   Stress: Not on file   Social Connections: Not on file   Intimate Partner Violence: Not on file   Housing Stability: Not on file       FAMILY HISTORY:  Family History   Problem Relation Age of Onset     Urolithiasis Father      Heart Disease Father         mi     Diabetes Sister      Urolithiasis Cousin      Cancer No family hx of      Gout No family hx of      Clotting Disorder No family hx of         ROS:  12 point review of systems reviewed and is negative except for what has already been mentioned in HPI.      PHYSICAL EXAM:  GENRL:   BP (!) 142/64   Pulse 89   Temp 100.3  F (37.9  C) (Oral)   Resp 18   Ht 1.651 m (5' 5\")   Wt 64.7 kg (142 lb 10.2 oz)   SpO2 97%   BMI 23.74 kg/m    No intake/output data recorded.  No intake/output data recorded.  HEENT: NC/AT  CHEST: Clear to auscultation bilateral anteriorly, no ronchi or wheezing  HEART: S1S2 " normal, regular.   ABDMN: Soft. Non-tender, non-distended.  No flank or suprapubic tenderness EXTRM: No pedal edema  INTGM: No skin rash, no cyanosis or clubbing  MUSCULOSKELETAL: no joint tenderness or swelling on upper and lower extremities  NEURO: Alert and oriented. No focal neurological deficit.        DIAGNOSTIC DATA:    Recent Labs   Lab 11/14/21 0035   WBC 10.0   HGB 12.6   HCT 38.4          Recent Labs   Lab 11/14/21 0035      CO2 26   BUN 19       No results for input(s): INR in the last 168 hours.    Recent Labs   Lab 11/14/21 0035      CO2 26   BUN 19   ALBUMIN 3.9   ALKPHOS 61   ALT 24   AST 39       [unfilled]    CT Abdomen Pelvis w/o Contrast    Result Date: 11/14/2021  EXAM: CT ABDOMEN PELVIS W/O CONTRAST LOCATION: [facility] [place] DATE/TIME: 11/14/2021 12:47 AM INDICATION: Fever. Flank pain. Lithotripsy yesterday. COMPARISON: 6/30/2021. TECHNIQUE: CT scan of the abdomen and pelvis was performed without IV contrast. Multiplanar reformats were obtained. Dose reduction techniques were used. CONTRAST: None. FINDINGS: LOWER CHEST: Normal. HEPATOBILIARY: Hepatic cysts. PANCREAS: Normal. SPLEEN: Normal. ADRENAL GLANDS: Normal. KIDNEYS/BLADDER: Left double-J ureteral stent in normal position. No stones are seen along the course of the stent. There are multiple small stones within the left kidney measuring up to 5 mm. Single 3 mm stone lower pole of the right kidney. Few renal cortical cysts bilaterally. BOWEL: There is no bowel obstruction or inflammation. The appendix is normal. Trace amount of free fluid in the pelvis. No free intraperitoneal gas. LYMPH NODES: Normal. VASCULATURE: Atherosclerotic calcification of the aorta and its branches. No aneurysm. PELVIC ORGANS: Normal. MUSCULOSKELETAL: Degenerative disease in the lumbar spine.     IMPRESSION: 1.  Left ureteral stent in place. No hydronephrosis. 2.  Small bilateral intrarenal stones. 3.  Trace free  fluid in the pelvis.       Patient's new lab studies reviewed personally.  Patient's new radiology reports reviewed personally.      Note created using dragon voice recognition software.  Errors in spelling or words which seems out of context are unintentional.  Sounds alike errors may have escaped editing.     11/14/2021

## 2021-11-14 NOTE — ED NOTES
Notified Dr. Portillo via page w/ update regarding fever (plan for tylenol) and pt request for throat lozenge.

## 2021-11-14 NOTE — PHARMACY-ADMISSION MEDICATION HISTORY
Pharmacy Note - Admission Medication History    Pertinent Provider Information: N/A     ______________________________________________________________________    Prior To Admission (PTA) med list completed and updated in EMR.       Current Facility-Administered Medications for the 11/14/21 encounter (Hospital Encounter)   Medication     gentamicin (GARAMYCIN) 80 mg in sterile water (bottle) 3,000 mL irrigation     PTA Med List   Medication Sig Note Last Dose     azelastine (ASTELIN) 137 mcg (0.1 %) nasal spray Spray 1 spray into both nostrils daily as needed   Past Week at prn     HEMP OIL OR EXTRACT OR OTHER CBD CANNABINOID, NOT MEDICAL CANNABIS, [HEMP OIL OR EXTRACT OR OTHER CBD CANNABINOID, NOT MEDICAL CANNABIS,]   Past Week at Unknown time     ibuprofen (ADVIL/MOTRIN) 200 MG tablet Take 600 mg by mouth every 6 hours as needed for mild pain  11/13/2021 at Unknown time     nitroFURantoin macrocrystal (MACRODANTIN) 100 MG capsule Take 1 capsule (100 mg) by mouth 2 times daily (Patient taking differently: Take 100 mg by mouth daily )  11/13/2021 at pm     oxyCODONE (ROXICODONE) 5 MG tablet Take 1-2 tablets (5-10 mg) by mouth every 4 hours as needed for pain 11/14/2021: Has NOT taken any Unknown at Unknown time       Information source(s): [facility]  Method of interview communication: in-person    Summary of Changes to PTA Med List  New: Advil  Discontinued: N/A  Changed: Nitrofurantoin from Bid to QD    Patient was asked about OTC/herbal products specifically.  PTA med list reflects this.    In the past week, patient estimated taking medication this percent of the time:  greater than 90%.    Allergies were reviewed, assessed, and updated with the patient.      Medications available for use during hospital stay: azelastine nasal spray (patient has with in her purse).     The information provided in this note is only as accurate as the sources available at the time of the  update(s).    Thank you for the opportunity to participate in the care of this patient.    Maribel Ruiz  11/14/2021 8:56 AM

## 2021-11-14 NOTE — PROGRESS NOTES
Brief [facility] Internal Medicine Progress Note       ASSESSMENT:    Active Problems:    Urinary tract infection      PLAN:    Patient seen and examined. Historical data reviewed. Please see admission H/P for additional information.     71-year-old female with history of hyperparathyroidism, ESBL, nephrolithiasis, left ureteral stent placed 11/12/2021 presents with urinary tract infection.  CT scan shows left ureteral stent in place with no hydronephrosis and small bilateral intrarenal stones.  --Start IV meropenem, follow-up urine and blood culture results  --Urology to follow    History of hyperparathyroidism: Status post parathyroidectomy in the past        DVT PPX: Subcu Lovenox            Anil Portillo D.O.  [phone]

## 2021-11-14 NOTE — CONSULTS
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 She has previously participated in stone risk evaluation and remains adherent to recommendations. She has no identified modifiable stone risk factors. She has identified non-modifiable stone risks including:  multiple stones at presentation, bilateral stones and resected hyperparathyroidism.     She had ureteroscopic clearance of large volume left renal stone 11/12 for known ESBL colonization after pre-op treatment with macrobid and intraop gentamicin IV and gentamicin intra-renal irrigation. Developed shaking chills and low grade fever on POD #1 and returned to ED. Has not obviously been passing stone fragments. Complains of severe urinary urgency and frequency She has been hemodynamically stable. Persistent chills after initiating meropenem.     She's had PTA ESBL in urine cx July, aug and October of this year.  She has taken macrobid for about 6 months prior to this admission.  Now with uncontrolled urination and a temp to 103.  Saw pt in bed 2 of ER      Pertinent past history, past infectious disease history:  Past Medical History   Past Medical History:   Diagnosis Date     Cancer (H)       anal     Kidney stone       UTI (urinary tract infection) 05/2021            Past Surgical History         Past Surgical History:   Procedure Laterality Date     BIOPSY VULVA N/A 12/20/2018     Procedure: EUA BIOPSY ANAL MASS;  Surgeon: Blaire Ashby MD;  Location: [facility];  Service: General     COMBINED CYSTOSCOPY, INSERT STENT URETER(S) Right 10/11/2019     Procedure: RIGHT CYSTOURETEROSCOPY WITH BASKET STONE EXTRACTION  AND URETERAL STENT INSERTION;  Surgeon: Aayush Rider MD;  Location: [facility]  OR;  Service: Urology     DILATION AND CURETTAGE                Current Outpatient Prescriptions          Current Outpatient Medications   Medication Sig Dispense Refill     azelastine (ASTELIN) 137 mcg (0.1 %) nasal spray [AZELASTINE (ASTELIN) 137 MCG (0.1 %) NASAL SPRAY] as needed.         HEMP OIL OR EXTRACT OR OTHER CBD CANNABINOID, NOT MEDICAL CANNABIS, [HEMP OIL OR EXTRACT OR OTHER CBD CANNABINOID, NOT MEDICAL CANNABIS,]          nitroFURantoin macrocrystal (MACRODANTIN) 100 MG capsule Take 1 capsule (100 mg) by mouth 2 times daily 60 capsule 0     oxyCODONE (ROXICODONE) 5 MG tablet Take 1-2 tablets (5-10 mg) by mouth every 4 hours as needed for pain 20 tablet 0                  Allergies   Allergen Reactions     Cocoa         Other reaction(s): Other (see comments)  Nasal congestion     Grass Extracts [Gramineae Pollens]         Other reaction(s): Other (see comments)  Sneezing, runny nose     Other Environmental Allergy Unknown       Seasonal allergies         Social History   Social History            Socioeconomic History     Marital status:        Spouse name: Not on file     Number of children: Not on file     Years of education: Not on file     Highest education level: Not on file   Occupational History     Not on file   Tobacco Use     Smoking status: Never Smoker     Smokeless tobacco: Never Used   Substance and Sexual Activity     Alcohol use: Yes       Comment: Alcoholic Drinks/day: very rare     Drug use: No     Sexual activity: Not on file   Other Topics Concern     Not on file   Social History Narrative     Not on file      Social Determinants of Health      Financial Resource Strain: Not on file   Food Insecurity: Not on file   Transportation Needs: Not on file   Physical Activity: Not on file   Stress: Not on file   Social Connections: Not on file   Intimate Partner Violence: Not on file   Housing Stability: Not on file            Family History         Medications:  Reviewed prior to  "admission meds as applicable in chart review.  Current meds are reviewed in the EMR listed MAR.     ANTIBIOTICS:    Current:merrem   Prior:   Allergy to:    SH/FH and  travel history(if applicable to consult):above    REVIEW OF SYSTEMS:  All other systems negative    EXAMINATION:  /52   Pulse 87   Temp 98.9  F (37.2  C) (Oral)   Resp 24   Ht 1.651 m (5' 5\")   Wt 64.7 kg (142 lb 10.2 oz)   SpO2 95%   BMI 23.74 kg/m    Alert, awake  Vitals tabulated above, reviewed  HEENT:nl  Neck supple without lymphadenopathy  Sclera clear  CARDIOVASCULAR regular rate and rhythm, no murmur  Lungs CLEAR TO AUSCULTATION   Abdomen soft, NT/ND, absent HEPATOSPLENOMEGALY  Skin normal  Joints normal  Neurologic exam non focal  Wound:  NA  Catheter in bladder      CLINICAL DATABASE FOR---LAB/MICRO/CULTURES/IMAGING STUDIES:  Results for VIKTOR MADDOX (MRN [id number]) as of 11/14/2021 12:51   Ref. Range 11/14/2021 00:23   Color Urine Latest Ref Range: Colorless, Straw, Light Yellow, Yellow  Light Yellow   Appearance Urine Latest Ref Range: Clear  Turbid (A)   Glucose Urine Latest Ref Range: Negative mg/dL Negative   Bilirubin Urine Latest Ref Range: Negative  Negative   Ketones Urine Latest Ref Range: Negative mg/dL Negative   Specific Gravity Urine Latest Ref Range: 1.001 - 1.030  1.017   pH Urine Latest Ref Range: 5.0 - 7.0  6.0   Protein Albumin Urine Latest Ref Range: Negative mg/dL 200 (A)   Urobilinogen mg/dL Latest Ref Range: <2.0 mg/dL <2.0   Nitrite Urine Latest Ref Range: Negative  Negative   Blood Urine Latest Ref Range: Negative  >1.0 mg/dL (A)   Leukocyte Esterase Urine Latest Ref Range: Negative  500 Keny/uL (A)   WBC Urine Latest Ref Range: <=5 / (H)   RBC Urine Latest Ref Range: <=2 /HPF >182 (H)   Bacteria Urine Latest Ref Range: None Seen /HPF Few (A)   Mucus Urine Latest Ref Range: None Seen /LPF Present (A)   Results for VIKTOR AMDDOX (MRN [id number]) as of 11/14/2021 12:51   Ref. Range " 11/14/2021 00:35   WBC Latest Ref Range: 4.0 - 11.0 10e3/uL 10.0   Hemoglobin Latest Ref Range: 11.7 - 15.7 g/dL 12.6   Hematocrit Latest Ref Range: 35.0 - 47.0 % 38.4   Platelet Count Latest Ref Range: 150 - 450 10e3/uL 207   RBC Count Latest Ref Range: 3.80 - 5.20 10e6/uL 3.92   MCV Latest Ref Range: 78 - 100 fL 98   MCH Latest Ref Range: 26.5 - 33.0 pg 32.1   MCHC Latest Ref Range: 31.5 - 36.5 g/dL 32.8   RDW Latest Ref Range: 10.0 - 15.0 % 12.4      Dx: Calculus of kidney    Specimen Information: Kidney, Right; Calculus/Stone         0 Result Notes    Culture Culture in progress       1+ Gram negative bacilli Abnormal             Resulting Agency: [facility]           Specimen Collected: 11/12/21 10:04 AM Last Resulted: 11/14/21  9:46 AM             MUSCULOSKELETAL: Degenerative disease in the lumbar spine.                                                                      IMPRESSION:   1.  Left ureteral stent in place. No hydronephrosis.  2.  Small bilateral intrarenal stones.  3.  Trace free fluid in the pelvis.    IMPRESSION:  Post calcular renal/urinary sepsis  BC pending  Stone cx very likely ESBL    PLAN:  Merrem  If + BC picc or midline  If negative couple days carb, then fosfo or other PO if possible.   Will follow      JW LONDONO MD  [facility]  Office [phone]

## 2021-11-14 NOTE — CONSULTS
Care Management Initial Consult    General Information  Assessment completed with: Patient, pt  Type of CM/SW Visit: Initial Assessment    Primary Care Provider verified and updated as needed: Yes   Readmission within the last 30 days: no previous admission in last 30 days   Return Category: Progression of disease  Reason for Consult: discharge planning  Advance Care Planning: Advance Care Planning Reviewed: no concerns identified  declined       Communication Assessment  Patient's communication style: spoken language (English or Bilingual)    Hearing Difficulty or Deaf: yes   Wear Glasses or Blind: no    Cognitive  Cognitive/Neuro/Behavioral: WDL                      Living Environment:   People in home: spouse     Current living Arrangements: house      Able to return to prior arrangements: yes       Family/Social Support:  Care provided by: self  Provides care for: no one  Marital Status:             Description of Support System: Supportive    Support Assessment: Adequate family and caregiver support    Current Resources:   Patient receiving home care services: No     Community Resources: None  Equipment currently used at home: none  Supplies currently used at home: Hearing Aid Batteries (glasses)    Employment/Financial:  Employment Status:          Financial Concerns: No concerns identified   Referral to Financial Counselor: No       Lifestyle & Psychosocial Needs:  Social Determinants of Health     Tobacco Use: Low Risk      Smoking Tobacco Use: Never Smoker     Smokeless Tobacco Use: Never Used   Alcohol Use: Not on file   Financial Resource Strain: Not on file   Food Insecurity: Not on file   Transportation Needs: Not on file   Physical Activity: Not on file   Stress: Not on file   Social Connections: Not on file   Intimate Partner Violence: Not on file   Depression: Not on file   Housing Stability: Not on file       Functional Status:  Prior to admission patient needed assistance:   Dependent  ADLs:: Independent  Dependent IADLs:: Independent  Assesssment of Functional Status: Not at baseline with ADL Functioning    Mental Health Status:  Mental Health Status: No Current Concerns       Chemical Dependency Status:                Values/Beliefs:  Spiritual, Cultural Beliefs, Protestant Practices, Values that affect care:                 Additional Information:  LISA assessed, lives w/spouse and no svcs, independent at baseline and family to transport at discharge.      Javan Jacobson RN

## 2021-11-14 NOTE — ED PROVIDER NOTES
EMERGENCY DEPARTMENT ENCOUNTER      NAME: Rosmery Heard  AGE: 71 year old female  YOB: 1950  MRN: [id number]  EVALUATION DATE & TIME: No admission date for patient encounter.    PCP: Pratibha Irene        Chief Complaint   Patient presents with     Fever         FINAL IMPRESSION:  1. Urinary tract infection with hematuria, site unspecified          ED COURSE & MEDICAL DECISION MAKING:    Pertinent Labs & Imaging studies reviewed. (See chart for details)  71 year old female presents to the Emergency Department for evaluation of malaise, chills, urinary frequency    ED Course as of 11/14/21 0358   Sun Nov 14, 2021   0234 Patient presents with fever, chills, myalgias, has history of ESBL and had procedure yesterday where stent was placed for kidney stone.  Most likely developing urosepsis.  Also considered Covid, renal colic, pneumonia, or other infectious process   0234 Plan for CT to evaluate for hydronephrosis.  No hydronephrosis seen fortunately.  CRP is elevated.  White counts 10.  Lactic acid is normal.   0235 Given IV fluids.  Blood culture sent.  Urine culture sent.   0235 Plan for meropenem based on ESBL history   0235 Lactic Acid: 1.5   0235 SARS CoV2 PCR: Negative   0235 Bacteria Urine(!): Few   0235 RBC Urine(!): >182   0235 Leukocyte Esterase Urine(!): 500 Keny/uL  Concerning for infection   0235 Temp: 100.3  F (37.9  C)   0235 Pulse: 96   0235 Temp: 99.5  F (37.5  C)   0235 HR 85       Based on ESBL and stents plan for admission for IV meropenem.    12:09AM I met with the patient for the initial interview and physical examination. Discussed plan for treatment and workup in the ED. PPE: Provider wore gloves, face shield, and N95 mask.  2:21 AM I discussed the case with Dr. Rider from [facility].  3:33 AM I spoke with Dr. Meza, hospitalist who agrees to admit the patient.         At the conclusion of the encounter I discussed the results of all of the tests and the disposition. The questions  were answered. The patient or family acknowledged understanding and was agreeable with the care plan.         MEDICATIONS GIVEN IN THE EMERGENCY:  Medications   0.9% sodium chloride BOLUS (0 mLs Intravenous Stopped 11/14/21 0244)   meropenem (MERREM) 2 g in sodium chloride 0.9 % 100 mL intermittent infusion (0 g Intravenous Stopped 11/14/21 0310)   0.9% sodium chloride BOLUS (1,000 mLs Intravenous New Bag 11/14/21 0331)       NEW PRESCRIPTIONS STARTED AT TODAY'S ER VISIT  New Prescriptions    No medications on file            =================================================================    HPI    Patient information was obtained from: patient     Use of Intrepreter: N/A         Rosmery Heard is a 71 year old female with a pertinent history of kidney stones s/p stenting, UTIs, and ESBL who presents to this ED as a walk in for evaluation of fever.     Per chart review, patient underwent lithotripsy, calculus removal, and ureteral stent placement on 11/12/21 (yesterday) with Dr. Rider. Procedure was challenging due to large branched lower pole stone, resulting in prolonged procedure. Also found diffuse signs of chronic inflammation (pyelitis cystica) in keeping with chronic infection. Patient tolerated the procedure well and was taken to the recovery room in good condition with instructions to remove own stent in 6 days and return to clinic in 1 months for CT scan.     Patient has a history of ESBL and takes macrobid chronically. She saw Dr. Rider yesterday to have a kidney stone removed and stents placed but reports low grade fever, chills, frequency, urinary incontinence, and myalgias. Denies cough, flank pain, vomiting, or any other complaints at this time.      REVIEW OF SYSTEMS   Review of Systems   Constitutional: Positive for chills and fever.   Respiratory: Negative for cough.    Cardiovascular: Negative for chest pain.   Gastrointestinal: Negative for vomiting.   Endocrine: Negative for polyuria.    Genitourinary: Positive for difficulty urinating, frequency and vaginal pain. Negative for flank pain.        Positive for incontinence.   Musculoskeletal: Positive for myalgias.   Skin: Negative for rash.   Neurological: Negative for headaches.   Psychiatric/Behavioral: Negative for confusion.        PAST MEDICAL HISTORY:  Past Medical History:   Diagnosis Date     Cancer (H)     anal     Kidney stone      UTI (urinary tract infection) 05/2021       PAST SURGICAL HISTORY:  Past Surgical History:   Procedure Laterality Date     BIOPSY VULVA N/A 12/20/2018    Procedure: EUA BIOPSY ANAL MASS;  Surgeon: Blaire Ashby MD;  Location: [facility];  Service: General     COMBINED CYSTOSCOPY, INSERT STENT URETER(S) Right 10/11/2019    Procedure: RIGHT CYSTOURETEROSCOPY WITH BASKET STONE EXTRACTION  AND URETERAL STENT INSERTION;  Surgeon: Aayush Rider MD;  Location: [facility];  Service: Urology     DILATION AND CURETTAGE             CURRENT MEDICATIONS:    Patient's Medications   New Prescriptions    No medications on file   Previous Medications    AZELASTINE (ASTELIN) 137 MCG (0.1 %) NASAL SPRAY    [AZELASTINE (ASTELIN) 137 MCG (0.1 %) NASAL SPRAY] as needed.    HEMP OIL OR EXTRACT OR OTHER CBD CANNABINOID, NOT MEDICAL CANNABIS,    [HEMP OIL OR EXTRACT OR OTHER CBD CANNABINOID, NOT MEDICAL CANNABIS,]     NITROFURANTOIN MACROCRYSTAL (MACRODANTIN) 100 MG CAPSULE    Take 1 capsule (100 mg) by mouth 2 times daily    OXYCODONE (ROXICODONE) 5 MG TABLET    Take 1-2 tablets (5-10 mg) by mouth every 4 hours as needed for pain   Modified Medications    No medications on file   Discontinued Medications    No medications on file       ALLERGIES:  Allergies   Allergen Reactions     Cocoa      Other reaction(s): Other (see comments)  Nasal congestion     Grass Extracts [Gramineae Pollens]      Other reaction(s): Other (see comments)  Sneezing, runny nose     Other Environmental Allergy Unknown     Seasonal  "allergies       FAMILY HISTORY:  Family History   Problem Relation Age of Onset     Urolithiasis Father      Heart Disease Father         mi     Diabetes Sister      Urolithiasis Cousin      Cancer No family hx of      Gout No family hx of      Clotting Disorder No family hx of        SOCIAL HISTORY:   Social History     Socioeconomic History     Marital status:      Spouse name: Not on file     Number of children: Not on file     Years of education: Not on file     Highest education level: Not on file   Occupational History     Not on file   Tobacco Use     Smoking status: Never Smoker     Smokeless tobacco: Never Used   Substance and Sexual Activity     Alcohol use: Yes     Comment: Alcoholic Drinks/day: very rare     Drug use: No     Sexual activity: Not on file   Other Topics Concern     Not on file   Social History Narrative     Not on file     Social Determinants of Health     Financial Resource Strain: Not on file   Food Insecurity: Not on file   Transportation Needs: Not on file   Physical Activity: Not on file   Stress: Not on file   Social Connections: Not on file   Intimate Partner Violence: Not on file   Housing Stability: Not on file       VITALS:  Patient Vitals for the past 24 hrs:   BP Temp Temp src Pulse Resp SpO2 Height Weight   11/14/21 0351 -- 100.3  F (37.9  C) Oral -- -- -- -- --   11/14/21 0204 -- 99.5  F (37.5  C) Oral -- -- -- -- --   11/14/21 0200 (!) 142/64 -- -- 89 18 97 % -- --   11/13/21 2358 134/68 100.3  F (37.9  C) Oral 96 16 97 % 1.651 m (5' 5\") 64.7 kg (142 lb 10.2 oz)       PHYSICAL EXAM    Vitals: BP (!) 142/64   Pulse 89   Temp 100.3  F (37.9  C) (Oral)   Resp 18   Ht 1.651 m (5' 5\")   Wt 64.7 kg (142 lb 10.2 oz)   SpO2 97%   BMI 23.74 kg/m    General: Appears in no acute distress, awake, alert, interactive.  Eyes: Conjunctivae non-injected. Sclera anicteric.  HENT: Atraumatic.  Neck: Supple.  Respiratory/Chest: Respiration unlabored.  No crackles or " rhonchi  Abdomen: non distended, no abdominal tenderness  Musculoskeletal: Normal extremities. No edema or erythema.  Skin: Normal color. No rash or diaphoresis.  Neurologic: Face symmetric, moves all extremities spontaneously. Speech clear.  Psychiatric: Oriented to person, place, and time. Affect appropriate.       LAB:  All pertinent labs reviewed and interpreted.  Results for orders placed or performed during the hospital encounter of 11/14/21   CT Abdomen Pelvis w/o Contrast    Impression    IMPRESSION:   1.  Left ureteral stent in place. No hydronephrosis.  2.  Small bilateral intrarenal stones.  3.  Trace free fluid in the pelvis.     CBC (+ platelets, no diff)   Result Value Ref Range    WBC Count 10.0 4.0 - 11.0 10e3/uL    RBC Count 3.92 3.80 - 5.20 10e6/uL    Hemoglobin 12.6 11.7 - 15.7 g/dL    Hematocrit 38.4 35.0 - 47.0 %    MCV 98 78 - 100 fL    MCH 32.1 26.5 - 33.0 pg    MCHC 32.8 31.5 - 36.5 g/dL    RDW 12.4 10.0 - 15.0 %    Platelet Count 207 150 - 450 10e3/uL   Comprehensive metabolic panel   Result Value Ref Range    Sodium 141 136 - 145 mmol/L    Potassium 3.7 3.5 - 5.0 mmol/L    Chloride 108 (H) 98 - 107 mmol/L    Carbon Dioxide (CO2) 26 22 - 31 mmol/L    Anion Gap 7 5 - 18 mmol/L    Urea Nitrogen 19 8 - 28 mg/dL    Creatinine 0.82 0.60 - 1.10 mg/dL    Calcium 9.4 8.5 - 10.5 mg/dL    Glucose 124 70 - 125 mg/dL    Alkaline Phosphatase 61 45 - 120 U/L    AST 39 0 - 40 U/L    ALT 24 0 - 45 U/L    Protein Total 7.4 6.0 - 8.0 g/dL    Albumin 3.9 3.5 - 5.0 g/dL    Bilirubin Total 0.4 0.0 - 1.0 mg/dL    GFR Estimate 72 >60 mL/min/1.73m2   CRP inflammation   Result Value Ref Range    CRP 3.8 (H) 0.0-<0.8 mg/dL   UA with Microscopic reflex to Culture    Specimen: Urine, Midstream   Result Value Ref Range    Color Urine Light Yellow Colorless, Straw, Light Yellow, Yellow    Appearance Urine Turbid (A) Clear    Glucose Urine Negative Negative mg/dL    Bilirubin Urine Negative Negative    Ketones Urine  Negative Negative mg/dL    Specific Gravity Urine 1.017 1.001 - 1.030    Blood Urine >1.0 mg/dL (A) Negative    pH Urine 6.0 5.0 - 7.0    Protein Albumin Urine 200  (A) Negative mg/dL    Urobilinogen Urine <2.0 <2.0 mg/dL    Nitrite Urine Negative Negative    Leukocyte Esterase Urine 500 Keny/uL (A) Negative    Bacteria Urine Few (A) None Seen /HPF    Mucus Urine Present (A) None Seen /LPF    RBC Urine >182 (H) <=2 /HPF    WBC Urine 101 (H) <=5 /HPF   Lactic acid whole blood   Result Value Ref Range    Lactic Acid 1.5 0.7 - 2.0 mmol/L   Asymptomatic COVID-19 Virus (Coronavirus) by PCR Nasopharyngeal    Specimen: Nasopharyngeal; Swab   Result Value Ref Range    SARS CoV2 PCR Negative Negative       RADIOLOGY:  Reviewed all pertinent imaging. Please see official radiology report.  CT Abdomen Pelvis w/o Contrast   Final Result   IMPRESSION:    1.  Left ureteral stent in place. No hydronephrosis.   2.  Small bilateral intrarenal stones.   3.  Trace free fluid in the pelvis.               I, Julia Oliver, am serving as a scribe to document services personally performed by Dr. Morris based on my observation and the provider's statements to me. I, Tay Morris MD attest that Julia Oliver is acting in a scribe capacity, has observed my performance of the services and has documented them in accordance with my direction.    Tay Morris M.D.  Emergency Medicine  [facility] EMERGENCY DEPARTMENT  [address] [place] [id number] [phone]  Dept: [phone]     Satish Morris MD  11/14/21 7198

## 2021-11-14 NOTE — ED NOTES
"[facility] ED Handoff Report    ED Chief Complaint: fever, urinary frequency, Lithotripsy on the 12th, L) ureteral stent, hx of UTI    ED Diagnosis:  (N39.0,  R31.9) Urinary tract infection with hematuria, site unspecified  Comment: abx, pure wick  Plan: admit       PMH:    Past Medical History:   Diagnosis Date     Cancer (H)     anal     Kidney stone      UTI (urinary tract infection) 05/2021        Code Status:  Full Code     Falls Risk: No Band: Not applicable    Current Living Situation/Residence: lives with a significant other and son,  has stomach bug, will not be coming up, anticipating no visitors today     Elimination Status: Continent: No-varies at times due to urgency     Activity Level: SBA    Patients Preferred Language:  English     Needed: No    Vital Signs:  /52   Pulse 87   Temp 98.9  F (37.2  C) (Oral)   Resp 24   Ht 1.651 m (5' 5\")   Wt 64.7 kg (142 lb 10.2 oz)   SpO2 95%   BMI 23.74 kg/m       Cardiac Rhythm: regular    Pain Score: 0/10    Is the Patient Confused:  No    Last Food or Drink: 11/14/21 at 1200 apple juice, poor appetite this AM    Focused Assessment:  Frequent urination, pure wick in place, see I/Os, fever this AM treated w/ tylenol, severe chills at that time now resolved    Tests Performed: Done: Labs    Treatments Provided:  Abx, pure wick, fluid boluses, see EMAR    Family Dynamics/Concerns: No--pt uses cell phone and updates contacts as desires, little discussion about family matters    Family Updated On Visitor Policy: Yes    Plan of Care Communicated to Family: Yes    Who Was Updated about Plan of Care: patient has notified     Belongings Checklist Done and Signed by Patient: Yes    Covid: asymptomatic , negative- fever related to UTI    Additional Information: call with any questions or needs, thanks!     RN: Joanne Buckley  . 11/14/2021 11:49 AM     "

## 2021-11-14 NOTE — CONSULTS
Assessment/Plan:    Assessment & Plan     Post-ureteroscopy sepsis -> presume ESBL documented pre-op, IV antibiotics for 1-2 weeks then PO Macrobid x 1 month      No name on file.  [facility] EMERGENCY DEPARTMENT    Subjective:     HPI  Ms. 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 She has previously participated in stone risk evaluation and remains adherent to recommendations. She has no identified modifiable stone risk factors. She has identified non-modifiable stone risks including:  multiple stones at presentation, bilateral stones and resected hyperparathyroidism.    She had ureteroscopic clearance of large volume left renal stone 11/12 for known ESBL colonization after pre-op treatment with macrobid and intraop gentamicin IV and gentamicin intra-renal irrigation. Developed shaking chills and low grade fever on POD #1 and returned to ED. Has not obviously been passing stone fragments. Complains of severe urinary urgency and frequency She has been hemodynamically stable. Persistent chills after initiating meropenem.    CT scan is personally reviewed and demonstrates small volume left renal stone debris without hydronephrosis with ureteral stent in good position..    It certainly appears that she has ESBL bacteremia. Intra-operative cultures should be available ahead of fresh set of blood and urine cultures from ED presentation.     Given absence of obvious PO antibiotics, aside from macrobid which lacks systemic effectiveness, she will require IV antibiotics probably extending for 2 weeks which will be beyond anticipated stent duration of ~1 week. Should probably continue with PO macrobid for another week.    Will request opinion from  Infectious Disease given significant infection with multi-resistant organism.    ROS   Review of systems is negative except for HPI    Past Medical History:   Diagnosis Date     Cancer (H)     anal     Kidney stone      UTI (urinary tract infection) 05/2021       Past Surgical History:   Procedure Laterality Date     BIOPSY VULVA N/A 12/20/2018    Procedure: EUA BIOPSY ANAL MASS;  Surgeon: Blaire Ashby MD;  Location: [facility];  Service: General     COMBINED CYSTOSCOPY, INSERT STENT URETER(S) Right 10/11/2019    Procedure: RIGHT CYSTOURETEROSCOPY WITH BASKET STONE EXTRACTION  AND URETERAL STENT INSERTION;  Surgeon: Aayush Rider MD;  Location: [facility];  Service: Urology     DILATION AND CURETTAGE         Current Outpatient Medications   Medication Sig Dispense Refill     azelastine (ASTELIN) 137 mcg (0.1 %) nasal spray [AZELASTINE (ASTELIN) 137 MCG (0.1 %) NASAL SPRAY] as needed.       HEMP OIL OR EXTRACT OR OTHER CBD CANNABINOID, NOT MEDICAL CANNABIS, [HEMP OIL OR EXTRACT OR OTHER CBD CANNABINOID, NOT MEDICAL CANNABIS,]        nitroFURantoin macrocrystal (MACRODANTIN) 100 MG capsule Take 1 capsule (100 mg) by mouth 2 times daily 60 capsule 0     oxyCODONE (ROXICODONE) 5 MG tablet Take 1-2 tablets (5-10 mg) by mouth every 4 hours as needed for pain 20 tablet 0       Allergies   Allergen Reactions     Cocoa      Other reaction(s): Other (see comments)  Nasal congestion     Grass Extracts [Gramineae Pollens]      Other reaction(s): Other (see comments)  Sneezing, runny nose     Other Environmental Allergy Unknown     Seasonal allergies       Social History     Socioeconomic History     Marital status:      Spouse name: Not on file     Number of children: Not on file     Years of education: Not on file     Highest education level: Not on file   Occupational History     Not on file   Tobacco Use     Smoking status: Never Smoker     Smokeless tobacco: Never Used   Substance and Sexual  Activity     Alcohol use: Yes     Comment: Alcoholic Drinks/day: very rare     Drug use: No     Sexual activity: Not on file   Other Topics Concern     Not on file   Social History Narrative     Not on file     Social Determinants of Health     Financial Resource Strain: Not on file   Food Insecurity: Not on file   Transportation Needs: Not on file   Physical Activity: Not on file   Stress: Not on file   Social Connections: Not on file   Intimate Partner Violence: Not on file   Housing Stability: Not on file       Family History   Problem Relation Age of Onset     Urolithiasis Father      Heart Disease Father         mi     Diabetes Sister      Urolithiasis Cousin      Cancer No family hx of      Gout No family hx of      Clotting Disorder No family hx of        Objective:     Vitals were reviewed  Patient Vitals for the past 8 hrs:   BP Temp Temp src Pulse Resp SpO2   11/14/21 0833 (!) 140/97 (!) 103  F (39.4  C) Oral 103 24 93 %   11/14/21 0700 121/58 -- -- 88 -- 95 %   11/14/21 0645 124/57 98.8  F (37.1  C) -- -- -- --   11/14/21 0636 -- -- -- 92 -- 96 %   11/14/21 0600 120/57 -- -- 88 -- 99 %   11/14/21 0500 120/57 -- -- 85 -- 98 %   11/14/21 0400 117/51 -- -- 95 -- 99 %   11/14/21 0351 -- 100.3  F (37.9  C) Oral -- -- --   11/14/21 0300 122/57 -- -- 83 -- 96 %   11/14/21 0204 -- 99.5  F (37.5  C) Oral -- -- --   11/14/21 0200 (!) 142/64 -- -- 89 18 97 %        LABS  Most Recent 3 CBC's:Recent Labs   Lab Test 11/14/21  0035 02/26/19  0600 02/24/19  0646   WBC 10.0 3.2* 3.2*   HGB 12.6 9.3* 9.4*   MCV 98 95 94    213 194     Most Recent 3 BMP's:Recent Labs   Lab Test 11/14/21  0035 11/19/19  1226 02/28/19  1754 02/28/19  0637 02/26/19  1847 02/26/19  0600 02/25/19  0612 02/24/19  0646     --   --   --   --  140  --  142   POTASSIUM 3.7  --  4.0 3.5   < > 3.5   < > 4.1   CHLORIDE 108*  --   --   --   --  109*  --  111*   CO2 26  --   --   --   --  24 --  25   BUN 19  --   --   --   --  12 --  15    CR 0.82 0.71  --   --   --  0.49*  --  0.59*   ANIONGAP 7  --   --   --   --  7  --  6   ANASTASIA 9.4 10.3  --   --   --  7.9*  --  8.4*     --   --   --   --  101  --  112    < > = values in this interval not displayed.     Most Recent 6 Bacteria Isolates From Any Culture (See EPIC Reports for Culture Details):No lab results found.  Most Recent Urinalysis:Recent Labs   Lab Test 11/14/21  0023 11/21/19  0715 11/19/19  1301   COLOR Light Yellow  --  Yellow   APPEARANCE Turbid*  --  Cloudy*   URINEGLC Negative  --  Negative   URINEBILI Negative  --  Negative   URINEKETONE Negative  --  Negative   SG 1.017  --  1.020   UBLD >1.0 mg/dL*  --  Trace*   URINEPH 6.0   < > 6.5   PROTEIN 200 *  --  Negative   UROBILINOGEN  --   --  0.2 E.U./dL   NITRITE Negative  --  Negative   LEUKEST 500 Keny/uL*  --  Small*   RBCU >182*  --   --    WBCU 101*  --   --     < > = values in this interval not displayed.     Most Recent ESR & CRP:Recent Labs   Lab Test 11/14/21  0035   CRP 3.8*

## 2021-11-15 LAB — BACTERIA UR CULT: NO GROWTH

## 2021-11-15 PROCEDURE — 99232 SBSQ HOSP IP/OBS MODERATE 35: CPT | Performed by: INTERNAL MEDICINE

## 2021-11-15 PROCEDURE — 250N000011 HC RX IP 250 OP 636: Performed by: STUDENT IN AN ORGANIZED HEALTH CARE EDUCATION/TRAINING PROGRAM

## 2021-11-15 PROCEDURE — 120N000001 HC R&B MED SURG/OB

## 2021-11-15 PROCEDURE — 250N000013 HC RX MED GY IP 250 OP 250 PS 637: Performed by: STUDENT IN AN ORGANIZED HEALTH CARE EDUCATION/TRAINING PROGRAM

## 2021-11-15 PROCEDURE — 99233 SBSQ HOSP IP/OBS HIGH 50: CPT | Performed by: STUDENT IN AN ORGANIZED HEALTH CARE EDUCATION/TRAINING PROGRAM

## 2021-11-15 RX ORDER — LIDOCAINE 4 G/G
1 PATCH TOPICAL
Status: DISCONTINUED | OUTPATIENT
Start: 2021-11-15 | End: 2021-11-17 | Stop reason: HOSPADM

## 2021-11-15 RX ADMIN — ACETAMINOPHEN 650 MG: 325 TABLET ORAL at 18:52

## 2021-11-15 RX ADMIN — ENOXAPARIN SODIUM 40 MG: 40 INJECTION SUBCUTANEOUS at 08:44

## 2021-11-15 RX ADMIN — MEROPENEM 1 G: 1 INJECTION, POWDER, FOR SOLUTION INTRAVENOUS at 02:00

## 2021-11-15 RX ADMIN — ACETAMINOPHEN 650 MG: 325 TABLET ORAL at 01:57

## 2021-11-15 RX ADMIN — ACETAMINOPHEN 650 MG: 325 TABLET ORAL at 08:43

## 2021-11-15 RX ADMIN — MEROPENEM 1 G: 1 INJECTION, POWDER, FOR SOLUTION INTRAVENOUS at 19:00

## 2021-11-15 RX ADMIN — ONDANSETRON 4 MG: 2 INJECTION INTRAMUSCULAR; INTRAVENOUS at 19:00

## 2021-11-15 RX ADMIN — OXYCODONE HYDROCHLORIDE 5 MG: 5 TABLET ORAL at 11:37

## 2021-11-15 RX ADMIN — MEROPENEM 1 G: 1 INJECTION, POWDER, FOR SOLUTION INTRAVENOUS at 11:05

## 2021-11-15 ASSESSMENT — ACTIVITIES OF DAILY LIVING (ADL)
ADLS_ACUITY_SCORE: 4
ADLS_ACUITY_SCORE: 8
ADLS_ACUITY_SCORE: 4
ADLS_ACUITY_SCORE: 4
ADLS_ACUITY_SCORE: 8
ADLS_ACUITY_SCORE: 8
ADLS_ACUITY_SCORE: 4
ADLS_ACUITY_SCORE: 4
ADLS_ACUITY_SCORE: 8
ADLS_ACUITY_SCORE: 4
ADLS_ACUITY_SCORE: 8
ADLS_ACUITY_SCORE: 4
ADLS_ACUITY_SCORE: 8
ADLS_ACUITY_SCORE: 4
ADLS_ACUITY_SCORE: 4
ADLS_ACUITY_SCORE: 8
ADLS_ACUITY_SCORE: 4
ADLS_ACUITY_SCORE: 4

## 2021-11-15 NOTE — PLAN OF CARE
Arrived on unit from ED at about 1500. Alert and oriented, no pain. Pt settled and oriented to room. External catheter maintained. Reviewed plan of care with pt. Continue to monitor.

## 2021-11-15 NOTE — PROGRESS NOTES
[facility] ID Inpatient follow up       Patient:  Rosmery Heard  Date of birth 1950, Medical record number [id number]  Date of Visit:  11/15/2021  Attending Physician: Anil Portillo DO         Assessment and Recommendations:   Assessment:  Rosmery Heard is a 71 year old female with   1. Appropriately recurrent calcium oxalate and calcium phosphate kidney stone.  2. Status post ureteroscopic clearance of large volume left renal stone on 2021.  3. Postoperative fever and chills.  Blood cultures no growth.  Stone cultures growing GNR.  Known to have ESBL E. coli.  On IV meropenem.    Recommendations:  1. Continue IV meropenem inpatient.  2. Follow pending ID and susceptibility of the GNR from the stone.  3. Likely to need IV antibiotic outpatient depending on the ID of the GNR.  4. Monitor left flank pain.    Discussed with the patient, son over the phone, and nursing staff.    ID will follow.    35 minutes of total care with greater than 57 coordinating care.    Manuelito Fuller MD.  [facility].   [facility]  Office Telephone [phone].  Fax [phone]  [facility] paging            Interval History:     HPI:  The interval history was reviewed.   New to me today.  Reports some left flank area pain that is new.  White in place with pinkish urine.    Pertinent cultures include:  No results found for: CULT    Recent Inflammatory Biomarkers:   Recent Labs   Lab Test 21  0035 19  0600 19  0646 19  1729 19  2048   CRP 3.8*  --   --   --   --    WBC 10.0 3.2* 3.2* 2.7* 0.8*            Review of Systems:   CONSTITUTIONAL:    Temp Max: Temp (24hrs), Av  F (37.2  C), Min:97.8  F (36.6  C), Max:99.7  F (37.6  C)   .  Negative except for findings in the HPI.           Current Medications (antimicrobials listed in bold):       enoxaparin ANTICOAGULANT  40 mg Subcutaneous Q24H     meropenem   "1 g Intravenous Q8H              Allergies:     Allergies   Allergen Reactions     Cocoa      Other reaction(s): Other (see comments)  Nasal congestion     Grass Extracts [Gramineae Pollens]      Other reaction(s): Other (see comments)  Sneezing, runny nose     Other Environmental Allergy Unknown     Seasonal allergies            Physical Exam:   Vitals were reviewed  Patient Vitals for the past 24 hrs:   BP Temp Temp src Pulse Resp SpO2 Height Weight   11/15/21 0834 114/55 99.7  F (37.6  C) Oral 77 18 98 % -- --   11/15/21 0335 107/52 99.1  F (37.3  C) Oral 83 18 95 % -- --   11/14/21 2334 120/59 99.1  F (37.3  C) Oral 74 16 96 % -- --   11/14/21 2028 97/50 97.8  F (36.6  C) Oral 70 20 95 % -- --   11/14/21 1638 -- -- -- -- -- -- 1.676 m (5' 6\") 65.6 kg (144 lb 11.2 oz)   11/14/21 1510 112/53 99.5  F (37.5  C) Oral 81 16 95 % 1.664 m (5' 5.5\") 65.6 kg (144 lb 11.2 oz)   11/14/21 1133 103/52 98.9  F (37.2  C) Oral 87 -- 95 % -- --       Physical Examination:  Gen: Pleasant in no acute distress.  HEENT: NCAT. EOMI. PERRL.  Neck: No bruit, JVD or thyromegaly.  Lungs: Clear to ascultation bilat with no crackles or wheezes.  Card: RRR. NSR. No RMG. Peripheral pulses present and symmetric. No edema.  Abd: Soft NT ND. No mass. Normal bowel sounds.  : White in place with semibloody urine.  Back: Mild left-sided CVA tenderness.  Skin: No rash.  Extr: No edema.  Neuro: Alert and oriented to place time and person. Cranial nerves II to XII intact. Motor and sensory intact. Normal gait.            Laboratory Data:   ID Labs:  Microbiology labs:  Reviewed           Contains abnormal data Calculus/Stone Aerobic Bacterial Culture Routine  Order: [id number]   Collected 11/12/2021 10:04 AM       Status: Preliminary result       Visible to patient: No (not released)       Dx: Calculus of kidney      Specimen Information: Kidney, Right; Calculus/Stone         0 Result Notes      Culture Culture in progress       1+ Gram negative " bacilli Abnormal             Resulting Agency: [facility]             Specimen Collected: 11/12/21 10:04 AM Last Resulted: 11/14/21  9:46 AM           Urine Culture  Order: [id number]   Collected 10/13/2021 12:09 PM     Status: Final result     Visible to patient: Yes (seen)     Dx: Personal history of urinary (tract) i...    Specimen Information: Urine, NOS         0 Result Notes    Culture >100,000 CFU/mL Escherichia coli ESBL Abnormal             Resulting Agency: [email or web address]       Susceptibility     Escherichia coli ESBL     JIGNA     Ampicillin >16 ug/mL Resistant     Ampicillin/ Sulbactam 16/8 ug/mL Resistant     Cefazolin >16 ug/mL Resistant     Cefepime >16 ug/mL Resistant     Ceftazidime 4 ug/mL Resistant     Ceftriaxone >32 ug/mL Resistant     Ciprofloxacin >2 ug/mL Resistant     Gentamicin <=2 ug/mL Susceptible     Levofloxacin >4 ug/mL Resistant     Meropenem <=0.5 ug/mL Susceptible     Nitrofurantoin <=16 ug/mL Susceptible     Piperacillin/Tazobactam <=2/4 ug/mL Resistant     Tetracycline >8 ug/mL Resistant     Tobramycin <=2 ug/mL Susceptible     Trimethoprim/Sulfamethoxazole >2/38 ug/mL Resistant                  Specimen Collected: 10/13/21 12:09 PM Last Resulted: 10/15/21 12:29 PM               Recent Labs   Lab Test 11/14/21 0035   CRP 3.8*     Recent Labs   Lab Test 11/14/21 0035 02/26/19  0600 02/24/19  0646 02/23/19  1729 02/18/19  2048   WBC 10.0 3.2* 3.2* 2.7* 0.8*     Recent Labs   Lab Test 11/14/21 0035 11/19/19  1226 02/26/19  0600 02/24/19  0646   CR 0.82 0.71 0.49* 0.59*   GFRESTIMATED 72 >60 >60 >60       Hematology Studies  Recent Labs   Lab Test 11/14/21 0035 02/26/19  0600 02/24/19  0646 02/23/19  1729 02/18/19  2048   WBC 10.0 3.2* 3.2* 2.7* 0.8*   HCT 38.4 27.3* 27.1* 28.5* 31.8*    213 194 198 161       Metabolic  Recent Labs   Lab Test 11/14/21 0035 11/19/19  1226 02/26/19  0600 02/24/19  0646     --  140 142   BUN 19  --  12 15   CO2 26  --  24 25   CR 0.82 0.71 0.49* 0.59*    GFRESTIMATED 72 >60 >60 >60       Hepatic Studies  Recent Labs   Lab Test 11/14/21  0035 02/23/19  1729 02/18/19  2048   BILITOTAL 0.4 0.8 0.8   ALKPHOS 61 56 45   ALBUMIN 3.9 2.2* 3.0*   AST 39 19 11   ALT 24 18 9       ImmunologlobulinsNo lab results found.         Imaging Data:   Reviewed   Reviewed

## 2021-11-15 NOTE — PLAN OF CARE
A&O x4, denies pain. Just woke up from sleeping after receiving an oxycodone for left flank pain. External catheter connected, patent, and draining. Bed alarm on. Call light in place. Continue with POC.

## 2021-11-15 NOTE — PROGRESS NOTES
"[facility] Medicine Progress Note      ASSESSMENT:    Active Problems:    Urinary tract infection      PLAN:    71-year-old female with history of hyperparathyroidism, ESBL, nephrolithiasis, and left ureteral stent placed 11/12/2021 presents with urinary tract infection.  CT scan shows left ureteral stent in place with no hydronephrosis and small bilateral intrarenal stones.  --Stone culture from 11/12/2021 growing E. Coli  --Urine culture from admission 11/14/2021 is without growth, blood cultures from admission are also without growth  --continue IV meropenem, follow-up urine and blood culture results  --Urology and ID to follow  --Anticipate need for PICC line and discharge home on IV antibiotics however will await final ID opinion       History of hyperparathyroidism: Status post parathyroidectomy in the past      Low back pain: Suspect related to UTI versus musculoskeletal strain from laying in bed  --Supportive cares, Tylenol, lidocaine patch   DVT PPX: Subcu Lovenox             Anil Portillo D.O.              -------------------------------------------------------------------------------------------------------------  SUBJECTIVE: NAD. Notes new mild low back pain. Denies any nausea, vomiting, abdominal pain, chest pain, SOB, new swelling, fevers, chills, confusion or headache.     Exam:  /56 (BP Location: Left arm)   Pulse 73   Temp 99.7  F (37.6  C) (Oral)   Resp 16   Ht 1.676 m (5' 6\")   Wt 65.6 kg (144 lb 11.2 oz)   SpO2 96%   BMI 23.36 kg/m    General: NAD  RESPIRATORY: Breathing nonlabored  CARDIOVASCULAR: No le edema bilat.   ABDOMEN: soft and non-tender  MUSCULOSKELETAL: Tenderness to palpation mid low back.  NEUROLOGIC: Motor and sensory intact, speech clear       Diagnostics Reviewed:      No results found for this or any previous visit (from the past 24 hour(s)).      "

## 2021-11-15 NOTE — PROGRESS NOTES
Much improved    Afebrile, chills have abated.    Cultures still pending. Probably safe to assume bacteremia associated with pre-op demonstrated ESBL.    Appreciate ID support.     Probably OK for discharge today with antiboitic (oral or IV) selected by ID.

## 2021-11-15 NOTE — PLAN OF CARE
Problem: Adult Inpatient Plan of Care  Goal: Absence of Hospital-Acquired Illness or Injury  Intervention: Identify and Manage Fall Risk  Recent Flowsheet Documentation  Taken 11/15/2021 1300 by Maria Luisa Foss RN  Safety Promotion/Fall Prevention: activity supervised  Taken 11/15/2021 0834 by Maria Luisa Foss RN  Safety Promotion/Fall Prevention: activity supervised  Intervention: Prevent Skin Injury  Recent Flowsheet Documentation  Taken 11/15/2021 1300 by Maria Luisa Foss RN  Body Position: position changed independently  Taken 11/15/2021 0834 by Maria Luisa Foss RN  Body Position: position changed independently   Co left flank pain medicated with Tylenol with no relief  Medicated with Oxycodone with good relief, stent in place  Urine blood tinged up with stand by assist. No fevers co   Decreased appetite no nausea, waiting for urine cultures   May need picc line for antibotics at home. Wbc 10.0

## 2021-11-15 NOTE — PLAN OF CARE
C/o of a headache located in the forehead and rating 5/10 requested acetaminophen and given. Affect uninterested and withdrawn. Admission completed with pt. Stated does have an advance directive but did not want to provide a copy during this admission. External catheter patent and draining. Pt able to reposition self independently in bed. Ate about 50% dinner. Waiting for abx from pharmacy and will give when received.     Bed alarm on, reviewed with pt rationale. Call light within reach. Bed alarm on. Pt acknowledges understanding.

## 2021-11-15 NOTE — PLAN OF CARE
Pt slept well. Purewick in place. Iv abx infused. Pt denies pain except for a mild headache which she got PRN tylenol with good effect x1. Urology following.

## 2021-11-16 ENCOUNTER — HOME INFUSION (PRE-WILLOW HOME INFUSION) (OUTPATIENT)
Dept: PHARMACY | Facility: CLINIC | Age: 71
End: 2021-11-16
Payer: MEDICARE

## 2021-11-16 DIAGNOSIS — N20.0 CALCULUS OF KIDNEY: ICD-10-CM

## 2021-11-16 DIAGNOSIS — N39.0 URINARY TRACT INFECTION: Primary | ICD-10-CM

## 2021-11-16 LAB
APPEARANCE STONE: NORMAL
BACTERIA SPEC CULT: ABNORMAL
COMPN STONE: NORMAL
NUMBER STONE: 3
SIZE STONE: NORMAL MM
SPECIMEN WT: 43 MG

## 2021-11-16 PROCEDURE — 120N000001 HC R&B MED SURG/OB

## 2021-11-16 PROCEDURE — 02HV33Z INSERTION OF INFUSION DEVICE INTO SUPERIOR VENA CAVA, PERCUTANEOUS APPROACH: ICD-10-PCS | Performed by: FAMILY MEDICINE

## 2021-11-16 PROCEDURE — 36569 INSJ PICC 5 YR+ W/O IMAGING: CPT

## 2021-11-16 PROCEDURE — 250N000011 HC RX IP 250 OP 636: Performed by: STUDENT IN AN ORGANIZED HEALTH CARE EDUCATION/TRAINING PROGRAM

## 2021-11-16 PROCEDURE — 99207 PR CDG-MDM COMPONENT: MEETS LOW - DOWN CODED: CPT | Performed by: INTERNAL MEDICINE

## 2021-11-16 PROCEDURE — 99231 SBSQ HOSP IP/OBS SF/LOW 25: CPT | Performed by: INTERNAL MEDICINE

## 2021-11-16 PROCEDURE — 250N000009 HC RX 250: Performed by: INTERNAL MEDICINE

## 2021-11-16 PROCEDURE — 250N000013 HC RX MED GY IP 250 OP 250 PS 637: Performed by: STUDENT IN AN ORGANIZED HEALTH CARE EDUCATION/TRAINING PROGRAM

## 2021-11-16 PROCEDURE — 99233 SBSQ HOSP IP/OBS HIGH 50: CPT | Performed by: STUDENT IN AN ORGANIZED HEALTH CARE EDUCATION/TRAINING PROGRAM

## 2021-11-16 PROCEDURE — 272N000450 HC KIT 4FR POWER PICC SINGLE LUMEN

## 2021-11-16 RX ORDER — ERTAPENEM 1 G/1
1 INJECTION, POWDER, LYOPHILIZED, FOR SOLUTION INTRAMUSCULAR; INTRAVENOUS EVERY 24 HOURS
Qty: 100 ML | Refills: 0
Start: 2021-11-16 | End: 2021-11-26

## 2021-11-16 RX ORDER — LIDOCAINE 40 MG/G
CREAM TOPICAL
Status: DISCONTINUED | OUTPATIENT
Start: 2021-11-16 | End: 2021-11-17 | Stop reason: HOSPADM

## 2021-11-16 RX ADMIN — ACETAMINOPHEN 650 MG: 325 TABLET ORAL at 06:19

## 2021-11-16 RX ADMIN — MEROPENEM 1 G: 1 INJECTION, POWDER, FOR SOLUTION INTRAVENOUS at 03:09

## 2021-11-16 RX ADMIN — MEROPENEM 1 G: 1 INJECTION, POWDER, FOR SOLUTION INTRAVENOUS at 19:36

## 2021-11-16 RX ADMIN — LIDOCAINE HYDROCHLORIDE 1.5 ML: 10 INJECTION, SOLUTION EPIDURAL; INFILTRATION; INTRACAUDAL; PERINEURAL at 12:50

## 2021-11-16 RX ADMIN — ENOXAPARIN SODIUM 40 MG: 40 INJECTION SUBCUTANEOUS at 08:42

## 2021-11-16 RX ADMIN — ACETAMINOPHEN 650 MG: 325 TABLET ORAL at 11:06

## 2021-11-16 RX ADMIN — MEROPENEM 1 G: 1 INJECTION, POWDER, FOR SOLUTION INTRAVENOUS at 11:07

## 2021-11-16 ASSESSMENT — ACTIVITIES OF DAILY LIVING (ADL)
ADLS_ACUITY_SCORE: 4

## 2021-11-16 ASSESSMENT — MIFFLIN-ST. JEOR: SCORE: 1189.47

## 2021-11-16 NOTE — PROGRESS NOTES
Therapy:IV ABX   Insurance: Medicare & BCBS Supplement     Pt has all Medicare products, which does not cover IV ABX in the home. (Pt would have coverage for short term TCU or IC). Below is what pt would be responsible for if pt wanted to go w [facility]      Drug would go to Part-D (pt would be responsible for the co-pay per dispense)    Pt would have to self-pay for the per-ron (daily)    If not homebound, nursing would also be self-pay ($90.00per visit)    Cost for Meropenem 1gm Q24 $55.67 per day     Please contact Intake with any questions, [phone] or In Basket pool, [facility] ([phone]).

## 2021-11-16 NOTE — PROGRESS NOTES
[facility] ID Inpatient follow up       Patient:  Rosmery Heard  Date of birth 1950, Medical record number [id number]  Date of Visit:  2021  Attending Physician: Anil Portillo DO         Assessment and Recommendations:   Assessment:  Rosmery Heard is a 71 year old female with   1. Appropriately recurrent calcium oxalate and calcium phosphate kidney stone.  2. Status post ureteroscopic clearance of large volume left renal stone on 2021.  3. Postoperative fever and chills.  Blood cultures no growth.  Stone cultures with ESBL E. coli.  On IV meropenem.    Recommendations:  1. Continue IV meropenem inpatient.  2. Can discharge on IV ertapenem 1 g daily for 10 days from 2021.  Order written.  3. PICC line today.  4. Monitor left flank pain.  5. No ID clinic follow-up necessary.  6. Prone to recurrent infection secondary to recurrent stone.      Discussed with the patient, Dr. Portillo, and nursing staff.    ID will sign off.    Manuelito Fuller MD.  [facility].   [facility]  Office Telephone [phone].  Fax [phone]  [facility] paging            Interval History:     HPI:  The interval history was reviewed.   Doing well.  Left flank pain has resolved.  No fever, chills, night sweats.  Cultures finalized with growth of the same ESBL E. coli.    Pertinent cultures include:  No results found for: CULT    Recent Inflammatory Biomarkers:   Recent Labs   Lab Test 21  0035 19  0600 19  0646 19  1729 19  2048   CRP 3.8*  --   --   --   --    WBC 10.0 3.2* 3.2* 2.7* 0.8*            Review of Systems:   CONSTITUTIONAL:    Temp Max: Temp (24hrs), Av  F (37.2  C), Min:97.8  F (36.6  C), Max:99.7  F (37.6  C)   .  Negative except for findings in the HPI.           Current Medications (antimicrobials listed in bold):       enoxaparin ANTICOAGULANT  40 mg Subcutaneous Q24H      lidocaine  1 patch Transdermal Q24H     lidocaine   Transdermal Q8H     meropenem  1 g Intravenous Q8H              Allergies:     Allergies   Allergen Reactions     Cocoa      Other reaction(s): Other (see comments)  Nasal congestion     Grass Extracts [Gramineae Pollens]      Other reaction(s): Other (see comments)  Sneezing, runny nose     Other Environmental Allergy Unknown     Seasonal allergies            Physical Exam:   Vitals were reviewed  Patient Vitals for the past 24 hrs:   BP Temp Temp src Pulse Resp SpO2   11/16/21 1107 115/56 99.5  F (37.5  C) Oral 72 17 95 %   11/16/21 0755 120/59 98.4  F (36.9  C) Oral 77 17 94 %   11/16/21 0015 114/58 98.3  F (36.8  C) Oral 64 18 92 %   11/15/21 1538 131/43 99.3  F (37.4  C) Oral 83 18 97 %       Physical Examination:  Gen: Pleasant in no acute distress.  HEENT: NCAT. EOMI. PERRL.  Neck: No bruit, JVD or thyromegaly.  Lungs: Clear to ascultation bilat with no crackles or wheezes.  Card: RRR. NSR. No RMG. Peripheral pulses present and symmetric. No edema.  Abd: Soft NT ND. No mass. Normal bowel sounds.  : White in place with semibloody urine.  Back: Mild left-sided CVA tenderness.  Skin: No rash.  Extr: No edema.  Neuro: Alert and oriented to place time and person. Cranial nerves II to XII intact. Motor and sensory intact. Normal gait.            Laboratory Data:   ID Labs:  Microbiology labs:  Reviewed           Contains abnormal data Calculus/Stone Aerobic Bacterial Culture Routine  Order: [id number]   Collected 11/12/2021 10:04 AM       Status: Preliminary result       Visible to patient: No (not released)       Dx: Calculus of kidney      Specimen Information: Kidney, Right; Calculus/Stone         0 Result Notes      Culture Culture in progress       1+ Gram negative bacilli Abnormal             Resulting Agency: [facility]             Specimen Collected: 11/12/21 10:04 AM Last Resulted: 11/14/21  9:46 AM           Urine Culture  Order: [id number]   Collected  10/13/2021 12:09 PM     Status: Final result     Visible to patient: Yes (seen)     Dx: Personal history of urinary (tract) i...    Specimen Information: Urine, NOS         0 Result Notes    Culture >100,000 CFU/mL Escherichia coli ESBL Abnormal             Resulting Agency: [email or web address]       Susceptibility     Escherichia coli ESBL     JIGNA     Ampicillin >16 ug/mL Resistant     Ampicillin/ Sulbactam 16/8 ug/mL Resistant     Cefazolin >16 ug/mL Resistant     Cefepime >16 ug/mL Resistant     Ceftazidime 4 ug/mL Resistant     Ceftriaxone >32 ug/mL Resistant     Ciprofloxacin >2 ug/mL Resistant     Gentamicin <=2 ug/mL Susceptible     Levofloxacin >4 ug/mL Resistant     Meropenem <=0.5 ug/mL Susceptible     Nitrofurantoin <=16 ug/mL Susceptible     Piperacillin/Tazobactam <=2/4 ug/mL Resistant     Tetracycline >8 ug/mL Resistant     Tobramycin <=2 ug/mL Susceptible     Trimethoprim/Sulfamethoxazole >2/38 ug/mL Resistant                  Specimen Collected: 10/13/21 12:09 PM Last Resulted: 10/15/21 12:29 PM             Contains abnormal data Calculus/Stone Aerobic Bacterial Culture Routine  Order: [id number]   Collected 11/12/2021 10:04 AM     Status: Final result     Visible to patient: Yes (not seen)     Dx: Calculus of kidney    Specimen Information: Kidney, Right; Calculus/Stone         0 Result Notes    Culture 1+ Escherichia coli ESBL Abnormal             Resulting Agency: [facility]       Susceptibility     Escherichia coli ESBL     JIGNA     Ampicillin >=32.0 ug/mL Resistant     Cefepime  Resistant     Ceftazidime  Resistant     Ceftriaxone  Resistant     Ciprofloxacin >=4.0 ug/mL Resistant     Gentamicin <=1.0 ug/mL Susceptible     Levofloxacin >=8.0 ug/mL Resistant     Meropenem <=0.25 ug/mL Susceptible 1     Piperacillin/Tazobactam <=4.0 ug/mL Susceptible     Tobramycin <=1.0 ug/mL Susceptible     Trimethoprim/Sulfamethoxazole >16/304 ug/mL Resistant              1 Enterobacterales that are susceptible to meropenem are  usually susceptible to ertapenem.                Recent Labs   Lab Test 11/14/21 0035   CRP 3.8*     Recent Labs   Lab Test 11/14/21 0035 02/26/19  0600 02/24/19  0646 02/23/19  1729 02/18/19  2048   WBC 10.0 3.2* 3.2* 2.7* 0.8*     Recent Labs   Lab Test 11/14/21 0035 11/19/19  1226 02/26/19  0600 02/24/19  0646   CR 0.82 0.71 0.49* 0.59*   GFRESTIMATED 72 >60 >60 >60       Hematology Studies  Recent Labs   Lab Test 11/14/21 0035 02/26/19  0600 02/24/19  0646 02/23/19  1729 02/18/19  2048   WBC 10.0 3.2* 3.2* 2.7* 0.8*   HCT 38.4 27.3* 27.1* 28.5* 31.8*    213 194 198 161       Metabolic  Recent Labs   Lab Test 11/14/21 0035 11/19/19  1226 02/26/19  0600 02/24/19  0646     --  140 142   BUN 19  --  12 15   CO2 26  --  24 25   CR 0.82 0.71 0.49* 0.59*   GFRESTIMATED 72 >60 >60 >60       Hepatic Studies  Recent Labs   Lab Test 11/14/21 0035 02/23/19  1729 02/18/19  2048   BILITOTAL 0.4 0.8 0.8   ALKPHOS 61 56 45   ALBUMIN 3.9 2.2* 3.0*   AST 39 19 11   ALT 24 18 9       ImmunologlobulinsNo lab results found.         Imaging Data:   Reviewed   Reviewed

## 2021-11-16 NOTE — PLAN OF CARE
Pain improved today medicated with Tylenol for left flank pain.  Refusing Lidocaine patch had picc line placed.  Appetite improved today will need IV antibotics at home.  Urine bloody stent in place.

## 2021-11-16 NOTE — PLAN OF CARE
Very happy this evening since son came to visit. Son brought soup from a favorite restaurant and pt ate better today than previous days during this admission. Bowel sounds hyperactive. Declined lidocaine patch due to having oxycodone during the am shift and still felt its effect. Gave tylenol for headache rating 3/10, effective. Reviewed plan of care with pt. Safety precautions maintained. Continue to monitor.

## 2021-11-16 NOTE — PROCEDURES
"PICC Line Insertion Procedure Note    Pt. Name: Rosmery Heard  MRN:        [id number]    Procedure: Insertion of a  SINGLE Lumen  4 fr  Bard SOLO (valved) Power PICC, Lot number [id number]    Indications: Antibiotic    Contraindications : None    Procedure Details   Patient identified with 2 identifiers and \"Time Out\" conducted.  .     Central line insertion bundle followed: hand hygiene performed prior to procedure, site cleansed with cholraprep, hat, mask, sterile gloves,sterile gown worn, patient draped with maximum barrier head to toe drape, sterile field maintained.    The vein was assessed and found to be compressible and of adequate size. 1.5 ml 1% Lidocaine administered sq to the insertion site. A 4 Fr PICC was inserted into the BASILIC vein of the right arm with ultrasound guidance. ONE attempt(s) required to access vein.   Catheter threaded without difficulty. Good blood return noted.    Modified Seldinger Technique used for insertion.    The 8 sharps that are included in the PICC insertion kit were accounted for and disposed of in the sharps container prior to breakdown of the sterile field.    Catheter secured with Statlock, biopatch and Tegaderm dressing applied.    Findings:  Total catheter length  37 cm, with 1 cm exposed. Mid upper arm circumference is 28 cm. Catheter was flushed with 20 cc NS. Patient  tolerated procedure well.    Tip placement verified in the distal SVC by [employer]:        CLABSI prevention brochure left at bedside.    Patient's primary RN notified PICC is ready for use.    Comments:          Piter Estrada, RN, MSN, VA-BC  [facility]        "

## 2021-11-16 NOTE — PROGRESS NOTES
Care Management Follow Up    Length of Stay (days): 2    Expected Discharge Date: 11/17/2021     Concerns to be Addressed:  PICC line placement, referral made to home infusion     Patient plan of care discussed at interdisciplinary rounds: Yes    Anticipated Discharge Disposition:  Home with home Infusion  Anticipated Discharge Services: home infusion for IV antibotics  Anticipated Discharge DME: none    Patient/family educated on Medicare website which has current facility and service quality ratings:    Education Provided on the Discharge Plan:  Per team  Patient/Family in Agreement with the Plan:  yes    Referrals Placed by CM/SW:  Not at this time  Private pay costs discussed: Not applicable    Additional Information:  MD update, will need home infusion arranged.  Referral made to [facility].    Met with patient to introduce care management role and assist with discharge- including but not limited to: outpatient infusion,  home infusion vs TCU. Patient's son able to assist with home infusion needs.    1445 Deborah with [facility] notes pt does not have coverage for home infusion from Medicare of [facility] insurance plans. Private pay daily costs for supplies/meropenem $55.67/day. [facility] will reach out to [facility] for price quotes.    [facility] quote $67.86/per day for home infusion. Awaiting quote from [facility].           Clare Khan RN

## 2021-11-16 NOTE — PROGRESS NOTES
[facility]    Referral received from Clare Khan CM,  for IV antibiotics.    Benefits verified.  Pt has does not have coverage for IV antibiotics under her Medicare or BCBS supplement plan.  She will be self pay.  Cost for Meropenem 1 gm IV q 24 hr is $55.67/day for drug and supplies.  Nursing is only covered if she is medically homebound.  If not, [facility] charges $90/visit.      Requests for self-pay quotes from [place] and [facility] have been made at the request of Clare Khan CM.  Writer will speak with pt to review benefits, home infusion and to offer choice of agency/home infusion provider.     Thank you for the referral.    Michelle Zuniga RN, BSN  [facility] Liaison  [phone] (Mon through Fri, 8:00 am-5:00 pm)  [phone] (Office)

## 2021-11-16 NOTE — PLAN OF CARE
Continuing use of external catheter, but was incontinent x1 and voided in the bathroom with an extra 200 mL in the suction canister. Pt would like to speak with MD about why her urine is blood tinged. Communicated to the morning team.     Endorsed back flank pain 5/10. Gave Tylenol  mg. Continue to monitor.     Pt anticipates to discharge today.     Safety precautions remains in place.    Ambulatory

## 2021-11-16 NOTE — PROGRESS NOTES
"[facility] Progress Note      ASSESSMENT:    Active Problems:    Urinary tract infection      PLAN:    71-year-old female with history of hyperparathyroidism, ESBL, nephrolithiasis, and left ureteral stent placed 11/12/2021 presents with urinary tract infection.  CT scan shows left ureteral stent in place with no hydronephrosis and small bilateral intrarenal stones.  --Stone culture from 11/12/2021 growing ESBL  --Urine culture from admission 11/14/2021 is without growth, blood cultures from admission are also without growth  --continue IV meropenem, plan discharge on IV ertapenem 1 g daily for 10 days from 11/16/2021.  -- PICC placed 11/16  -- discharge home 11/17 after home infusion can be set up  -- appreciate urology and ID following       History of hyperparathyroidism: Status post parathyroidectomy in the past      Low back pain: Resolved with tylenol and lidocaine patch. Suspect related to UTI versus musculoskeletal strain from laying in bed  --continue Tylenol, lidocaine patch       DVT PPX: Subcu Lovenox             Anil Portillo D.O.              -------------------------------------------------------------------------------------------------------------  SUBJECTIVE: NAD. Denies any nausea, vomiting, abdominal pain, chest pain, SOB, new swelling, fevers, chills, confusion or headache.     Exam:  /56   Pulse 72   Temp 99.5  F (37.5  C) (Oral)   Resp 17   Ht 1.676 m (5' 6\")   Wt 65.6 kg (144 lb 11.2 oz)   SpO2 95%   BMI 23.36 kg/m    General: NAD  RESPIRATORY: Breathing nonlabored  CARDIOVASCULAR: No le edema bilat.   NEUROLOGIC: Motor and sensory intact, speech clear       Diagnostics Reviewed:      No results found for this or any previous visit (from the past 24 hour(s)).      "

## 2021-11-17 VITALS
HEART RATE: 82 BPM | RESPIRATION RATE: 16 BRPM | DIASTOLIC BLOOD PRESSURE: 52 MMHG | HEIGHT: 66 IN | TEMPERATURE: 98.1 F | WEIGHT: 145 LBS | BODY MASS INDEX: 23.3 KG/M2 | OXYGEN SATURATION: 98 % | SYSTOLIC BLOOD PRESSURE: 106 MMHG

## 2021-11-17 PROBLEM — Z16.12 UTI DUE TO EXTENDED-SPECTRUM BETA LACTAMASE (ESBL) PRODUCING ESCHERICHIA COLI: Status: ACTIVE | Noted: 2021-11-08

## 2021-11-17 PROBLEM — B96.29 UTI DUE TO EXTENDED-SPECTRUM BETA LACTAMASE (ESBL) PRODUCING ESCHERICHIA COLI: Status: ACTIVE | Noted: 2021-11-08

## 2021-11-17 LAB — PLATELET # BLD AUTO: 188 10E3/UL (ref 150–450)

## 2021-11-17 PROCEDURE — 250N000011 HC RX IP 250 OP 636: Performed by: STUDENT IN AN ORGANIZED HEALTH CARE EDUCATION/TRAINING PROGRAM

## 2021-11-17 PROCEDURE — 250N000013 HC RX MED GY IP 250 OP 250 PS 637: Performed by: STUDENT IN AN ORGANIZED HEALTH CARE EDUCATION/TRAINING PROGRAM

## 2021-11-17 PROCEDURE — 85049 AUTOMATED PLATELET COUNT: CPT | Performed by: INTERNAL MEDICINE

## 2021-11-17 PROCEDURE — 99239 HOSP IP/OBS DSCHRG MGMT >30: CPT | Performed by: FAMILY MEDICINE

## 2021-11-17 PROCEDURE — 258N000003 HC RX IP 258 OP 636: Performed by: FAMILY MEDICINE

## 2021-11-17 PROCEDURE — 36415 COLL VENOUS BLD VENIPUNCTURE: CPT | Performed by: INTERNAL MEDICINE

## 2021-11-17 PROCEDURE — 250N000011 HC RX IP 250 OP 636: Performed by: FAMILY MEDICINE

## 2021-11-17 RX ORDER — ACETAMINOPHEN 325 MG/1
650 TABLET ORAL EVERY 4 HOURS PRN
COMMUNITY
Start: 2021-11-17 | End: 2021-12-20

## 2021-11-17 RX ADMIN — ERTAPENEM SODIUM 500 MG: 1 INJECTION, POWDER, LYOPHILIZED, FOR SOLUTION INTRAMUSCULAR; INTRAVENOUS at 16:44

## 2021-11-17 RX ADMIN — ACETAMINOPHEN 650 MG: 325 TABLET ORAL at 08:57

## 2021-11-17 RX ADMIN — MEROPENEM 1 G: 1 INJECTION, POWDER, FOR SOLUTION INTRAVENOUS at 03:30

## 2021-11-17 RX ADMIN — MEROPENEM 1 G: 1 INJECTION, POWDER, FOR SOLUTION INTRAVENOUS at 11:14

## 2021-11-17 RX ADMIN — ENOXAPARIN SODIUM 40 MG: 40 INJECTION SUBCUTANEOUS at 08:57

## 2021-11-17 ASSESSMENT — ACTIVITIES OF DAILY LIVING (ADL)
ADLS_ACUITY_SCORE: 8
ADLS_ACUITY_SCORE: 4
ADLS_ACUITY_SCORE: 8
ADLS_ACUITY_SCORE: 4
ADLS_ACUITY_SCORE: 12
ADLS_ACUITY_SCORE: 14
ADLS_ACUITY_SCORE: 4
ADLS_ACUITY_SCORE: 4
ADLS_ACUITY_SCORE: 8
ADLS_ACUITY_SCORE: 4
ADLS_ACUITY_SCORE: 4
ADLS_ACUITY_SCORE: 8
ADLS_ACUITY_SCORE: 14
ADLS_ACUITY_SCORE: 8
ADLS_ACUITY_SCORE: 14
ADLS_ACUITY_SCORE: 14
ADLS_ACUITY_SCORE: 12
ADLS_ACUITY_SCORE: 4

## 2021-11-17 NOTE — DISCHARGE SUMMARY
[facility] MEDICINE  DISCHARGE SUMMARY  [facility]     Primary Care Physician: Pratibha Irene  Admission Date: 11/14/2021   Discharge Provider: Annita Sanchez DO Discharge Date: 11/16/2021   Diet: regular   Code Status: Full Code   Activity: stable        Condition at Discharge: as tolerated      REASON FOR PRESENTATION(See Admission Note for Details)   70 yo female with recurrent kidney stones presented with ESBL UTI following stone retrieval and ureteral stent placement.    PRINCIPAL & ACTIVE DISCHARGE DIAGNOSES     Active Problems:    Urinary tract infection    Primary hyperparathyroidism (H)    UTI due to extended-spectrum beta lactamase (ESBL) producing Escherichia coli      SIGNIFICANT FINDINGS (Imaging, labs):     Impression:     IMPRESSION:   1.  Left ureteral stent in place. No hydronephrosis.   2.  Small bilateral intrarenal stones.   3.  Trace free fluid in the pelvis.        PENDING LABS     [unfilled]      PROCEDURES ( this hospitalization only)          RECOMMENDATIONS TO OUTPATIENT PROVIDER FOR F/U VISIT     Follow up with primary care within 1 week  Follow up with Dr Rider for further kidney stone management    DISPOSITION     Home with home care    SUMMARY OF HOSPITAL COURSE:      Infected nephrolithiasis, ESBL UTI  Patient with recent ureteral stent placement on 11/12/2021 and presented on 11/14 with dysuria and abdominal pain.  Patient did initially present with fever but without leukocytosis or hemodynamic changes, lactic acid normal no signs of sepsis.  Ureteral stone biopsy from 11/12 with ESBL, although UA at time of admission negative for infection.  Patient started on IV meropenem per infectious disease recommendations and should continue IV treatment (with ertapenem) for 10 days total outpatient.  PICC line in place for access.    Acute low back pain  Multifactorial with UTI, recent nephrolithiasis.  Resolved with conservative  interventions and tylenol.    Hyperparathyroidism  S/p parathyroidectomy.  Stable    Discharge Medications with Med changes:        Review of your medicines      START taking      Dose / Directions   acetaminophen 325 MG tablet  Commonly known as: TYLENOL  Used for: Acute cystitis with hematuria      Dose: 650 mg  Take 2 tablets (650 mg) by mouth every 4 hours as needed for mild pain or fever  Refills: 0     ertapenem 1 GM vial  Commonly known as: INVanz  Used for: Urinary tract infection with hematuria, site unspecified      Dose: 1 g  Inject 1 g into the vein every 24 hours for 10 days  Quantity: 100 mL  Refills: 0        CONTINUE these medicines which have NOT CHANGED      Dose / Directions   azelastine 0.1 % nasal spray  Commonly known as: ASTELIN      Dose: 1 spray  Spray 1 spray into both nostrils daily as needed  Refills: 0     HEMP OIL OR EXTRACT OR OTHER CBD CANNABINOID (NOT MEDICAL CANNABIS)      [HEMP OIL OR EXTRACT OR OTHER CBD CANNABINOID, NOT MEDICAL CANNABIS,]  Refills: 0     oxyCODONE 5 MG tablet  Commonly known as: ROXICODONE  Used for: Calculus of kidney      Dose: 5-10 mg  Take 1-2 tablets (5-10 mg) by mouth every 4 hours as needed for pain  Quantity: 20 tablet  Refills: 0        STOP taking    ibuprofen 200 MG tablet  Commonly known as: ADVIL/MOTRIN        nitroFURantoin macrocrystal 100 MG capsule  Commonly known as: MACRODANTIN              Where to get your medicines      Some of these will need a paper prescription and others can be bought over the counter. Ask your nurse if you have questions.    You don't need a prescription for these medications    acetaminophen 325 MG tablet             Rationale for medication changes:            Consults   Urology, infectious disease      Immunizations given this encounter     [unfilled]       Anticoagulation Information      Recent INR results: No results for input(s): INR in the last 168 hours.        Discharge Orders     Discharge Procedure  Orders   Home infusion referral   Referral Priority: Routine Referral Type: Consultation   Number of Visits Requested: 1     Reason for your hospital stay   Order Comments: ESBL UTI following urological procedure     Follow-up and recommended labs and tests    Order Comments: Follow up with primary care provider, Pratibha Irene, within 7 days for hospital follow- up.  No follow up labs or test are needed.    RN discharge follow up clinic visit ([facility] clinics only) within 7 days.     Activity   Order Comments: Your activity upon discharge: activity as tolerated     Order Specific Question Answer Comments   Is discharge order? Yes      MD face to face encounter   Order Comments: Documentation of Face to Face and Certification for Home Health Services    I certify that patient: Rosmery Heard is under my care and that I, or a nurse practitioner or physician's assistant working with me, had a face-to-face encounter that meets the physician face-to-face encounter requirements with this patient on: 11/17/2021.    This encounter with the patient was in whole, or in part, for the following medical condition, which is the primary reason for home health care: home antibiotic infusions.    I certify that, based on my findings, the following services are medically necessary home health services: Home infusion services    My clinical findings support the need for the above services because: home IV antibiotics needed for 10 days from time of discharge    Further, I certify that my clinical findings support that this patient is homebound (i.e. absences from home require considerable and taxing effort and are for medical reasons or [facility] services or infrequently or of short duration when for other reasons) because: finishing IV antibiotics course is more appropriate at home vs in the hospital, patient without acute illness or other medical needs outside of completing IV antibiotic course.    Based on the above findings.  "I certify that this patient is confined to the home and needs intermittent skilled nursing care, physical therapy and/or speech therapy.  The patient is under my care, and I have initiated the establishment of the plan of care.  This patient will be followed by a physician who will periodically review the plan of care.  Physician/Provider to provide follow up care: Pratibha Irene    Attending hospital physician (the Medicare certified [facility]ASHARD provider): Annita Sanchez DO  Physician Signature: See electronic signature associated with these discharge orders.  Date: 11/17/2021     Diet   Order Comments: Follow this diet upon discharge: Orders Placed This Encounter      Regular Diet Adult     Order Specific Question Answer Comments   Is discharge order? Yes      Examination     Vital signs:  Temp: 98.5  F (36.9  C) Temp src: Oral BP: 118/64 Pulse: 76   Resp: 18 SpO2: 93 % O2 Device: None (Room air)   Height: 167.6 cm (5' 6\") Weight: 65.8 kg (145 lb)  Estimated body mass index is 23.4 kg/m  as calculated from the following:    Height as of this encounter: 1.676 m (5' 6\").    Weight as of this encounter: 65.8 kg (145 lb).         Physical Examination:   General appearance - alert, well appearing, and in no distress and oriented to person, place, and time  Mental status - alert, oriented to person, place, and time, normal mood, behavior, speech, dress, motor activity, and thought processes  HEENT - sclera anicteric, left eye normal, right eye normal, nares normal and patent, no erythema  Neck - supple, no significant adenopathy, thyroid exam: thyroid is normal in size without nodules or tenderness  Lymphatics - no palpable lymphadenopathy, no hepatosplenomegaly  Respiratory - no tachypnea, retractions or cyanosis  Cardiac - normal rate, regular rhythm  Neurological - alert, oriented, normal speech, no focal findings or movement disorder noted  Musculoskeletal - no joint tenderness, deformity or swelling, full range of " motion without pain  Extremities - peripheral pulses normal, no pedal edema, no clubbing or cyanosis  Skin - normal coloration and turgor, no rashes, no suspicious skin lesions noted      Please see EMR for more detailed significant labs, imaging, consultant notes etc.  Total time spent on discharge: 50 minutes    Annita Sanchez DO    [facility]: Ph:[phone]    CC:Pratibha Irene

## 2021-11-17 NOTE — PLAN OF CARE
Pt denies pain. Warm blanket applied  above pic line. Pt incont of urine. Straining voided urine. Blood tinge urine.

## 2021-11-17 NOTE — PROGRESS NOTES
Awaiting for private pay quotes from home infusion agency. Left voicemail for [facility] ext [phone], RNCM contact information provided requesting call back on clinic availability for pt's infusion needs as alternative discharge plan. Pt declined TCU option.    1120 Left another message for [facility], requesting call back.    1200 Spoke with Anthony with [facility], [facility] and [facility] at capacity.     Benefits quotes from Home Care Agencies:  [facility]:  Meropenem $55.67/day for drug and supplies  Ertapenem $154/day for drug and supplies    [facility]:  Meropenem $48.86/day per drug and supplies  $97.43/ day per drug and supplies    Spoke with pt and son John, would like to discharge to home with [facility] and drug of choice that MD recommends. Pt and son would are interested in onsite teach between [phone].     Page out to Dr. Fuller, ask that hospitalist enter order for meropenem same dosage x10 day with same end date if pt and family okay with three time a day antibiotic dosing. Dr. Sanchez updated.    1400 Call from Caitlin with [facility], above quote for Meropenem was priced at 1 GM daily, will re-run patient's out of pocket costs for every 8 hours (x3 a day). MD updated.    1530 Patient's family decided they would like to discharge to home on daily ertapenem. Patient will receive first dose of ertapenem at hospital with onsite teach from [facility]. [facility] to arrange skilled home infusion skilled home nursing visits. Patient and son John updated; in agreement of above discharge plan. All parties updated and in agreement of above discharge plan.

## 2021-11-17 NOTE — PLAN OF CARE
Problem: Adult Inpatient Plan of Care  Goal: Plan of Care Review  Outcome: Improving     Problem: Adult Inpatient Plan of Care  Goal: Optimal Comfort and Wellbeing  Outcome: Improving     Slept during the night. Vital signs are stable. Alert and oriented x 4. Denied discomfort or any needs for pain medication.  Independent in the room. To be discharged home today.

## 2021-11-17 NOTE — DISCHARGE INSTRUCTIONS
Home care services have been arranged for you.  Agency: [facility]; Gracey will arrange skilled home infusion skilled nurse  Services: skilled infusion nurse visits  Phone: [phone]  Instructions: Home Care will contact you to arrange the first visit.

## 2021-11-18 ENCOUNTER — TELEPHONE (OUTPATIENT)
Dept: UROLOGY | Facility: CLINIC | Age: 71
End: 2021-11-18
Payer: MEDICARE

## 2021-11-18 NOTE — PLAN OF CARE
Pt discharging home with PICC line for IV antibiotics.  Home care nurse was here doing education on the PICC line.  Paperwork reviewed with pt and son, no further questions.  Briana De Jesus RN

## 2021-11-18 NOTE — TELEPHONE ENCOUNTER
Spoke with patient who states that she was able to remove her stent at 11:30a today.  She removed it without issue, but now is having some back pain.  She is taking tylenol and that is helpful, she will call with any changes.  She was set up for her one month post op CT and UC and virtual f/u.  Melly Mistry RN

## 2021-11-19 LAB
BACTERIA BLD CULT: NO GROWTH
BACTERIA BLD CULT: NO GROWTH

## 2021-11-20 LAB — BACTERIA SPEC CULT: NORMAL

## 2021-11-23 ENCOUNTER — TELEPHONE (OUTPATIENT)
Dept: INFECTIOUS DISEASES | Facility: CLINIC | Age: 71
End: 2021-11-23
Payer: MEDICARE

## 2021-12-16 ENCOUNTER — ANCILLARY PROCEDURE (OUTPATIENT)
Dept: RADIOLOGY | Facility: CLINIC | Age: 71
End: 2021-12-16
Payer: MEDICARE

## 2021-12-20 ENCOUNTER — VIRTUAL VISIT (OUTPATIENT)
Dept: UROLOGY | Facility: CLINIC | Age: 71
End: 2021-12-20
Payer: MEDICARE

## 2021-12-20 ENCOUNTER — LAB (OUTPATIENT)
Dept: LAB | Facility: HOSPITAL | Age: 71
End: 2021-12-20
Payer: MEDICARE

## 2021-12-20 DIAGNOSIS — N20.0 CALCULUS OF KIDNEY: Primary | ICD-10-CM

## 2021-12-20 DIAGNOSIS — N39.0 URINARY TRACT INFECTION: ICD-10-CM

## 2021-12-20 DIAGNOSIS — R31.9 URINARY TRACT INFECTION WITH HEMATURIA, SITE UNSPECIFIED: ICD-10-CM

## 2021-12-20 DIAGNOSIS — N39.0 URINARY TRACT INFECTION WITH HEMATURIA, SITE UNSPECIFIED: ICD-10-CM

## 2021-12-20 PROCEDURE — 99442 PR PHYSICIAN TELEPHONE EVALUATION 11-20 MIN: CPT | Performed by: UROLOGY

## 2021-12-20 PROCEDURE — 87086 URINE CULTURE/COLONY COUNT: CPT

## 2021-12-20 ASSESSMENT — PAIN SCALES - GENERAL: PAINLEVEL: NO PAIN (0)

## 2021-12-20 NOTE — PROGRESS NOTES
Patient is roomed via telephone for a virtual visit.  Patient confirmed she is in the [facility] at the time of this appointment.  Patient understands that this virtual visit is billable and agree to proceed with appointment.

## 2021-12-20 NOTE — PROGRESS NOTES
Assessment/Plan:    Assessment & Plan   Rosmery was seen today for post-op visit.    Diagnoses and all orders for this visit:    Calculus of kidney  -     CT Abdomen Pelvis w/o Contrast; Future    Urinary tract infection with hematuria, site unspecified        Stone Management Plan  Stone Management 7/1/2021 10/25/2021 12/20/2021   Urinary Tract Infection No suspicion of infection Suspected Infection Possible Infection   Renal Colic Asymptomatic at this time Well controlled symptoms Asymptomatic at this time   Renal Failure No suspicion of renal failure No suspicion of renal failure No suspicion of renal failure   Current CT date 6/30/2021 7/30/2021 12/16/2021   Right sided stones? Yes Yes Yes   R Number of ureteral stones No ureteral stones No ureteral stones No ureteral stones   R GSD of ureteral stones - - -   R Location of ureteral stone - - -   R Number of kidney stones  2 1 1   R GSD of kidney stones 2 - 4 4 - 10 2 - 4   R Hydronephrosis None None None   R Stone Event No current event No current event -   Diagnosis date - - -   Initial location of primary symptomatic stone - - -   Initial GSD of primary symptomatic stone - - -   Resolved date - - -   R Current Plan Observe - Observe   Clear rationale - - -   Observe rationale Limited stone burden with good prognosis for spontaneous passage - Limited stone burden with good prognosis for spontaneous passage   Left sided stones? Yes Yes Yes   L Number of ureteral stones No ureteral stones 1 No ureteral stones   L Number of kidney stones  1 1 3   L GSD of kidney stones 15 - 20 10 - 15 < 2   L Hydronephrosis None None None   L Stone Event New event Established event Resolved event   Diagnosis date 6/30/2021 - -   Initial location of primary symptomatic stone Renal - -   Initial GSD of primary symptomatic stone 15 - -   Resolved date - - 12/20/2021   L Current Plan Clear Clear Observe   Clear rationale Poor prognosis Associated treated infection -   Observe rationale  - - Limited stone burden with good prognosis for spontaneous passage           PLAN      Phone call duration: 9 minutes  19 minutes spent on the date of the encounter doing chart review, history and exam, documentation and further activities per the note    DWIGHT DOUGLAS MD  [facility] [place]    Subjective:     HPI  Ms. Rosmery Heard is a 71 year old  female who is being evaluated via a billable telephone visit by [facility] [place] for late postoperative follow-up.     She returns status post Left ureteroscopic laser lithotripsy for renal stone. She has had unexpected hospitalization for urinary tract infection. Was pre-emptive treatment for ESBL bacteruria.    She is asymptomatic at present. She denies symptoms of fever, chills, flank pain, nausea, vomiting, urinary frequency and dysuria.    New CT scan was personally reviewed and demonstrates largest residual fragment is <2 mm in the left renal lower pole with no hydronephrosis.     Stone composition was 70 % calcium oxalate and 30 % calcium phosphate (apatite).     She forgot to drop off urine culture as requested ahead of today's encounter. She will attend to that later today.    Urine culture results will guide next steps in therapy. If negative, will recheck in a ~3 months. If positive may consider further attempts at stone clearance. Will recheck CT in ~1 year. If new growth will re-evaluate post parathyroidectomy stone risks.         ROS   Review of systems is negative except for HPI.    Past Medical History:   Diagnosis Date     Cancer (H)     anal     Kidney stone      UTI (urinary tract infection) 05/2021       Past Surgical History:   Procedure Laterality Date     BIOPSY VULVA N/A 12/20/2018    Procedure: EUA BIOPSY ANAL MASS;  Surgeon: Blaire Ashby MD;  Location: [facility];  Service: General     COMBINED CYSTOSCOPY, INSERT STENT URETER(S) Right 10/11/2019    Procedure:  RIGHT CYSTOURETEROSCOPY WITH BASKET STONE EXTRACTION  AND URETERAL STENT INSERTION;  Surgeon: Aayush Rider MD;  Location: [facility];  Service: Urology     COMBINED CYSTOSCOPY, INSERT STENT URETER(S) Left 11/12/2021    Procedure: CYSTOURETEROSCOPY, WITH LASER LITHOTRIPSY, CALCULUS REMOVAL AND URETERAL STENT INSERTION;  Surgeon: Aayush Rider MD;  Location: [facility]     DILATION AND CURETTAGE       PICC SINGLE LUMEN PLACEMENT  11/16/2021            Current Outpatient Medications   Medication Sig Dispense Refill     azelastine (ASTELIN) 137 mcg (0.1 %) nasal spray Spray 1 spray into both nostrils daily as needed          Allergies   Allergen Reactions     Grass Extracts [Gramineae Pollens]      Other reaction(s): Other (see comments)  Sneezing, runny nose     Other Environmental Allergy Unknown     Seasonal allergies       Social History     Socioeconomic History     Marital status:      Spouse name: Not on file     Number of children: Not on file     Years of education: Not on file     Highest education level: Not on file   Occupational History     Not on file   Tobacco Use     Smoking status: Never Smoker     Smokeless tobacco: Never Used   Substance and Sexual Activity     Alcohol use: Yes     Comment: Alcoholic Drinks/day: very rare     Drug use: No     Sexual activity: Not on file   Other Topics Concern     Not on file   Social History Narrative     Not on file     Social Determinants of Health     Financial Resource Strain: Not on file   Food Insecurity: Not on file   Transportation Needs: Not on file   Physical Activity: Not on file   Stress: Not on file   Social Connections: Not on file   Intimate Partner Violence: Not on file   Housing Stability: Not on file       Family History   Problem Relation Age of Onset     Urolithiasis Father      Heart Disease Father         mi     Diabetes Sister      Urolithiasis Cousin      Cancer No family hx of      Gout No family hx of       Clotting Disorder No family hx of        Objective:     No vitals or physical exam obtained due to virtual visit  Labs     Most Recent 3 CBC's:Recent Labs   Lab Test 11/17/21  0950 11/14/21  0035 02/26/19  0600 02/24/19  0646   WBC  --  10.0 3.2* 3.2*   HGB  --  12.6 9.3* 9.4*   MCV  --  98 95 94    207 213 194     Most Recent 3 BMP's:Recent Labs   Lab Test 11/14/21  0035 11/19/19  1226 02/28/19  1754 02/28/19  0637 02/26/19  1847 02/26/19  0600 02/25/19  0612 02/24/19  0646     --   --   --   --  140  --  142   POTASSIUM 3.7  --  4.0 3.5   < > 3.5   < > 4.1   CHLORIDE 108*  --   --   --   --  109*  --  111*   CO2 26  --   --   --   --  24  --  25   BUN 19  --   --   --   --  12  --  15   CR 0.82 0.71  --   --   --  0.49*  --  0.59*   ANIONGAP 7  --   --   --   --  7  --  6   ANASTASIA 9.4 10.3  --   --   --  7.9*  --  8.4*     --   --   --   --  101  --  112    < > = values in this interval not displayed.     Most Recent 6 Bacteria Isolates From Any Culture (See EPIC Reports for Culture Details):No lab results found.  Acute Labs   Urine Culture    Culture   Date Value Ref Range Status   11/14/2021 No Growth  Final   11/14/2021 No Growth  Final   11/14/2021 No Growth  Final

## 2021-12-22 LAB — BACTERIA UR CULT: ABNORMAL

## 2021-12-23 ENCOUNTER — VIRTUAL VISIT (OUTPATIENT)
Dept: UROLOGY | Facility: CLINIC | Age: 71
End: 2021-12-23
Payer: MEDICARE

## 2021-12-23 DIAGNOSIS — N30.01 ACUTE CYSTITIS WITH HEMATURIA: Primary | ICD-10-CM

## 2021-12-23 PROCEDURE — 99442 PR PHYSICIAN TELEPHONE EVALUATION 11-20 MIN: CPT | Performed by: UROLOGY

## 2021-12-23 RX ORDER — NITROFURANTOIN 25; 75 MG/1; MG/1
100 CAPSULE ORAL 2 TIMES DAILY
Qty: 28 CAPSULE | Refills: 0 | Status: SHIPPED | OUTPATIENT
Start: 2021-12-23 | End: 2022-01-06

## 2021-12-23 ASSESSMENT — PAIN SCALES - GENERAL: PAINLEVEL: NO PAIN (0)

## 2021-12-23 NOTE — PROGRESS NOTES
Patient is roomed via telephone for a virtual visit.  Patient confirmed she is in the [facility] at the time of this appointment.  Patient understands that this virtual visit is billable and agree to proceed with appointment.

## 2021-12-23 NOTE — PROGRESS NOTES
Assessment/Plan:    Assessment & Plan   Rosmery was seen today for follow up.    Diagnoses and all orders for this visit:    Acute cystitis with hematuria  -     nitroFURantoin macrocrystal-monohydrate (MACROBID) 100 MG capsule; Take 1 capsule (100 mg) by mouth 2 times daily for 14 days  -     Urine Culture Aerobic Bacterial - lab collect; Future        Stone Management Plan  Stone Management 10/25/2021 12/20/2021 12/23/2021   Urinary Tract Infection Suspected Infection Possible Infection Suspected Infection   Renal Colic Well controlled symptoms Asymptomatic at this time Asymptomatic at this time   Renal Failure No suspicion of renal failure No suspicion of renal failure No suspicion of renal failure   Current CT date 7/30/2021 12/16/2021 -   Right sided stones? Yes Yes -   R Number of ureteral stones No ureteral stones No ureteral stones -   R GSD of ureteral stones - - -   R Location of ureteral stone - - -   R Number of kidney stones  1 1 -   R GSD of kidney stones 4 - 10 2 - 4 -   R Hydronephrosis None None -   R Stone Event No current event - No current event   Diagnosis date - - -   Initial location of primary symptomatic stone - - -   Initial GSD of primary symptomatic stone - - -   Resolved date - - -   R Current Plan - Observe -   Clear rationale - - -   Observe rationale - Limited stone burden with good prognosis for spontaneous passage -   Left sided stones? Yes Yes -   L Number of ureteral stones 1 No ureteral stones -   L Number of kidney stones  1 3 -   L GSD of kidney stones 10 - 15 < 2 -   L Hydronephrosis None None -   L Stone Event Established event Resolved event No current event   Diagnosis date - - -   Initial location of primary symptomatic stone - - -   Initial GSD of primary symptomatic stone - - -   Resolved date - 12/20/2021 -   L Current Plan Clear Observe -   Clear rationale Associated treated infection - -   Observe rationale - Limited stone burden with good prognosis for spontaneous  passage -           PLAN      Phone call duration: 8 minutes  13 minutes spent on the date of the encounter doing chart review, history and exam, documentation and further activities per the note    DWIGHT DOUGLAS MD  [facility] ST[facility] [place]    Subjective:     HPI  Ms. Rosmery Heard is a 71 year old  female who is being evaluated via a billable telephone visit by [facility] for follow up of her chronic UTI.    She has been doing well. No fever or chills. No voiding symptoms.    Urine culture demonstrates pan-sensitive enterococcus.     Previously, over the last 2 years or more, she has had very difficult to clear E coli ESBL. Most recent culture is a welcome change and probably demonstrates a shift to external reinfection as opposed to persistent colonization.    Will treat with macrobid for two weeks and recheck culture in about a month.         ROS   Review of systems is negative except for HPI.    Past Medical History:   Diagnosis Date     Cancer (H)     anal     Kidney stone      UTI (urinary tract infection) 05/2021       Past Surgical History:   Procedure Laterality Date     BIOPSY VULVA N/A 12/20/2018    Procedure: EUA BIOPSY ANAL MASS;  Surgeon: Blaire Ashby MD;  Location: [facility];  Service: General     COMBINED CYSTOSCOPY, INSERT STENT URETER(S) Right 10/11/2019    Procedure: RIGHT CYSTOURETEROSCOPY WITH BASKET STONE EXTRACTION  AND URETERAL STENT INSERTION;  Surgeon: Dwight Douglas MD;  Location: [facility];  Service: Urology     COMBINED CYSTOSCOPY, INSERT STENT URETER(S) Left 11/12/2021    Procedure: CYSTOURETEROSCOPY, WITH LASER LITHOTRIPSY, CALCULUS REMOVAL AND URETERAL STENT INSERTION;  Surgeon: Dwight Douglas MD;  Location: [facility]     DILATION AND CURETTAGE       PICC SINGLE LUMEN PLACEMENT  11/16/2021            Current Outpatient Medications   Medication Sig Dispense Refill      nitroFURantoin macrocrystal-monohydrate (MACROBID) 100 MG capsule Take 1 capsule (100 mg) by mouth 2 times daily for 14 days 28 capsule 0     azelastine (ASTELIN) 137 mcg (0.1 %) nasal spray Spray 1 spray into both nostrils daily as needed          Allergies   Allergen Reactions     Grass Extracts [Gramineae Pollens]      Other reaction(s): Other (see comments)  Sneezing, runny nose     Other Environmental Allergy Unknown     Seasonal allergies       Social History     Socioeconomic History     Marital status:      Spouse name: Not on file     Number of children: Not on file     Years of education: Not on file     Highest education level: Not on file   Occupational History     Not on file   Tobacco Use     Smoking status: Never Smoker     Smokeless tobacco: Never Used   Substance and Sexual Activity     Alcohol use: Yes     Comment: Alcoholic Drinks/day: very rare     Drug use: No     Sexual activity: Not on file   Other Topics Concern     Not on file   Social History Narrative     Not on file     Social Determinants of Health     Financial Resource Strain: Not on file   Food Insecurity: Not on file   Transportation Needs: Not on file   Physical Activity: Not on file   Stress: Not on file   Social Connections: Not on file   Intimate Partner Violence: Not on file   Housing Stability: Not on file       Family History   Problem Relation Age of Onset     Urolithiasis Father      Heart Disease Father         mi     Diabetes Sister      Urolithiasis Cousin      Cancer No family hx of      Gout No family hx of      Clotting Disorder No family hx of        Objective:     No vitals or physical exam obtained due to virtual visit  Labs     Most Recent 3 CBC's:Recent Labs   Lab Test 11/17/21  0950 11/14/21  0035 02/26/19  0600 02/24/19  0646   WBC  --  10.0 3.2* 3.2*   HGB  --  12.6 9.3* 9.4*   MCV  --  98 95 94    207 213 194     Most Recent 3 BMP's:Recent Labs   Lab Test 11/14/21  0035 11/19/19  1226  02/28/19  1754 02/28/19  0637 02/26/19  1847 02/26/19  0600 02/25/19  0612 02/24/19  0646     --   --   --   --  140  --  142   POTASSIUM 3.7  --  4.0 3.5   < > 3.5   < > 4.1   CHLORIDE 108*  --   --   --   --  109*  --  111*   CO2 26  --   --   --   --  24  --  25   BUN 19  --   --   --   --  12  --  15   CR 0.82 0.71  --   --   --  0.49*  --  0.59*   ANIONGAP 7  --   --   --   --  7  --  6   ANASTASIA 9.4 10.3  --   --   --  7.9*  --  8.4*     --   --   --   --  101  --  112    < > = values in this interval not displayed.     Most Recent Urinalysis:Recent Labs   Lab Test 11/14/21  0023 11/21/19  0715 11/19/19  1301   COLOR Light Yellow  --  Yellow   APPEARANCE Turbid*  --  Cloudy*   URINEGLC Negative  --  Negative   URINEBILI Negative  --  Negative   URINEKETONE Negative  --  Negative   SG 1.017  --  1.020   UBLD >1.0 mg/dL*  --  Trace*   URINEPH 6.0   < > 6.5   PROTEIN 200 *  --  Negative   UROBILINOGEN  --   --  0.2 E.U./dL   NITRITE Negative  --  Negative   LEUKEST 500 Keny/uL*  --  Small*   RBCU >182*  --   --    WBCU 101*  --   --     < > = values in this interval not displayed.     Acute Labs   Urine Culture    Culture   Date Value Ref Range Status   12/20/2021 50,000-100,000 CFU/mL Enterococcus faecalis (A)  Final   11/14/2021 No Growth  Final   11/14/2021 No Growth  Final

## 2022-01-24 ENCOUNTER — LAB (OUTPATIENT)
Dept: LAB | Facility: HOSPITAL | Age: 72
End: 2022-01-24
Payer: MEDICARE

## 2022-01-24 DIAGNOSIS — N30.01 ACUTE CYSTITIS WITH HEMATURIA: ICD-10-CM

## 2022-01-24 PROCEDURE — 87086 URINE CULTURE/COLONY COUNT: CPT

## 2022-01-25 LAB — BACTERIA UR CULT: NORMAL

## 2022-01-27 ENCOUNTER — VIRTUAL VISIT (OUTPATIENT)
Dept: UROLOGY | Facility: CLINIC | Age: 72
End: 2022-01-27
Payer: MEDICARE

## 2022-01-27 DIAGNOSIS — N20.0 CALCULUS OF KIDNEY: Primary | ICD-10-CM

## 2022-01-27 PROCEDURE — 99442 PR PHYSICIAN TELEPHONE EVALUATION 11-20 MIN: CPT | Performed by: UROLOGY

## 2022-01-27 ASSESSMENT — PAIN SCALES - GENERAL: PAINLEVEL: NO PAIN (0)

## 2022-01-27 NOTE — PROGRESS NOTES
Patient is roomed via telephone for a virtual visit.  Patient confirmed she is in the [facility] at the time of this appointment.  Patient understands that this virtual visit is billable and agree to proceed with appointment.

## 2022-01-27 NOTE — PROGRESS NOTES
Assessment/Plan:    Assessment & Plan   Rosmery was seen today for follow up.    Diagnoses and all orders for this visit:    Calculus of kidney  -     CT Abdomen Pelvis w/o Contrast; Future  -     Urine Culture Aerobic Bacterial - lab collect; Future        Stone Management Plan  Stone Management 12/20/2021 12/23/2021 1/27/2022   Urinary Tract Infection Possible Infection Suspected Infection No suspicion of infection   Renal Colic Asymptomatic at this time Asymptomatic at this time Asymptomatic at this time   Renal Failure No suspicion of renal failure No suspicion of renal failure No suspicion of renal failure   Current CT date 12/16/2021 - -   Right sided stones? Yes - -   R Number of ureteral stones No ureteral stones - -   R GSD of ureteral stones - - -   R Location of ureteral stone - - -   R Number of kidney stones  1 - -   R GSD of kidney stones 2 - 4 - -   R Hydronephrosis None - -   R Stone Event - No current event No current event   Diagnosis date - - -   Initial location of primary symptomatic stone - - -   Initial GSD of primary symptomatic stone - - -   Resolved date - - -   R Current Plan Observe - -   Clear rationale - - -   Observe rationale Limited stone burden with good prognosis for spontaneous passage - -   Left sided stones? Yes - -   L Number of ureteral stones No ureteral stones - -   L Number of kidney stones  3 - -   L GSD of kidney stones < 2 - -   L Hydronephrosis None - -   L Stone Event Resolved event No current event No current event   Diagnosis date - - -   Initial location of primary symptomatic stone - - -   Initial GSD of primary symptomatic stone - - -   Resolved date 12/20/2021 - -   L Current Plan Observe - -   Clear rationale - - -   Observe rationale Limited stone burden with good prognosis for spontaneous passage - -           PLAN      Phone call duration: 8 minutes  13 minutes spent on the date of the encounter doing chart review, history and exam, documentation and further  activities per the note    DWIGHT DOUGLAS MD  [facility]    Subjective:     HPI  Ms. Rosmery Heard is a 71 year old  female who is being evaluated via a billable telephone visit by [facility] [place] for follow up of her stones and UTI    She has done well in the interim. No symptoms of UTI or stone. Has had challenging to clear ESBL which was though to be associated with left renal stones. Has history of resected hyperparathyroidism.    Urine culture just demonstrates some micrograms laurel. She is congratulated.    Will recheck urine culture and CT in 6 months       ROS   Review of systems is negative except for HPI.    Past Medical History:   Diagnosis Date     Cancer (H)     anal     Kidney stone      UTI (urinary tract infection) 05/2021       Past Surgical History:   Procedure Laterality Date     BIOPSY VULVA N/A 12/20/2018    Procedure: EUA BIOPSY ANAL MASS;  Surgeon: Blaire Ashby MD;  Location: [facility];  Service: General     COMBINED CYSTOSCOPY, INSERT STENT URETER(S) Right 10/11/2019    Procedure: RIGHT CYSTOURETEROSCOPY WITH BASKET STONE EXTRACTION  AND URETERAL STENT INSERTION;  Surgeon: Dwight Douglas MD;  Location: [facility];  Service: Urology     COMBINED CYSTOSCOPY, INSERT STENT URETER(S) Left 11/12/2021    Procedure: CYSTOURETEROSCOPY, WITH LASER LITHOTRIPSY, CALCULUS REMOVAL AND URETERAL STENT INSERTION;  Surgeon: Dwight Douglas MD;  Location: [facility]     DILATION AND CURETTAGE       PICC SINGLE LUMEN PLACEMENT  11/16/2021            Current Outpatient Medications   Medication Sig Dispense Refill     azelastine (ASTELIN) 137 mcg (0.1 %) nasal spray Spray 1 spray into both nostrils daily as needed          Allergies   Allergen Reactions     Grass Extracts [Gramineae Pollens]      Other reaction(s): Other (see comments)  Sneezing, runny nose     Other Environmental Allergy Unknown      Seasonal allergies       Social History     Socioeconomic History     Marital status:      Spouse name: Not on file     Number of children: Not on file     Years of education: Not on file     Highest education level: Not on file   Occupational History     Not on file   Tobacco Use     Smoking status: Never Smoker     Smokeless tobacco: Never Used   Substance and Sexual Activity     Alcohol use: Yes     Comment: Alcoholic Drinks/day: very rare     Drug use: No     Sexual activity: Not on file   Other Topics Concern     Not on file   Social History Narrative     Not on file     Social Determinants of Health     Financial Resource Strain: Not on file   Food Insecurity: Not on file   Transportation Needs: Not on file   Physical Activity: Not on file   Stress: Not on file   Social Connections: Not on file   Intimate Partner Violence: Not on file   Housing Stability: Not on file       Family History   Problem Relation Age of Onset     Urolithiasis Father      Heart Disease Father         mi     Diabetes Sister      Urolithiasis Cousin      Cancer No family hx of      Gout No family hx of      Clotting Disorder No family hx of        Objective:     No vitals or physical exam obtained due to virtual visit  Labs     Most Recent 3 CBC's:Recent Labs   Lab Test 11/17/21  0950 11/14/21  0035 02/26/19  0600 02/24/19  0646   WBC  --  10.0 3.2* 3.2*   HGB  --  12.6 9.3* 9.4*   MCV  --  98 95 94    207 213 194     Most Recent 3 BMP's:Recent Labs   Lab Test 11/14/21  0035 11/19/19  1226 02/28/19  1754 02/28/19  0637 02/26/19  1847 02/26/19  0600 02/25/19  0612 02/24/19  0646     --   --   --   --  140  --  142   POTASSIUM 3.7  --  4.0 3.5   < > 3.5   < > 4.1   CHLORIDE 108*  --   --   --   --  109*  --  111*   CO2 26  --   --   --   --  24  --  25   BUN 19  --   --   --   --  12  --  15   CR 0.82 0.71  --   --   --  0.49*  --  0.59*   ANIONGAP 7  --   --   --   --  7  --  6   ANASTASIA 9.4 10.3  --   --   --  7.9*  --   8.4*     --   --   --   --  101  --  112    < > = values in this interval not displayed.     Most Recent Urinalysis:Recent Labs   Lab Test 11/14/21  0023 11/21/19  0715 11/19/19  1301   COLOR Light Yellow  --  Yellow   APPEARANCE Turbid*  --  Cloudy*   URINEGLC Negative  --  Negative   URINEBILI Negative  --  Negative   URINEKETONE Negative  --  Negative   SG 1.017  --  1.020   UBLD >1.0 mg/dL*  --  Trace*   URINEPH 6.0   < > 6.5   PROTEIN 200 *  --  Negative   UROBILINOGEN  --   --  0.2 E.U./dL   NITRITE Negative  --  Negative   LEUKEST 500 Keny/uL*  --  Small*   RBCU >182*  --   --    WBCU 101*  --   --     < > = values in this interval not displayed.     Acute Labs   Urine Culture    Culture   Date Value Ref Range Status   01/24/2022 <10,000 CFU/mL Mixture of urogenital laurel  Final   12/20/2021 50,000-100,000 CFU/mL Enterococcus faecalis (A)  Final   11/14/2021 No Growth  Final

## 2022-05-16 ENCOUNTER — LAB REQUISITION (OUTPATIENT)
Dept: LAB | Facility: CLINIC | Age: 72
End: 2022-05-16
Payer: MEDICARE

## 2022-05-16 DIAGNOSIS — E21.0 PRIMARY HYPERPARATHYROIDISM (H): ICD-10-CM

## 2022-05-16 DIAGNOSIS — E55.9 VITAMIN D DEFICIENCY, UNSPECIFIED: ICD-10-CM

## 2022-05-16 DIAGNOSIS — Z13.220 ENCOUNTER FOR SCREENING FOR LIPOID DISORDERS: ICD-10-CM

## 2022-05-16 LAB
CALCIUM SERPL-MCNC: 9.6 MG/DL (ref 8.5–10.5)
CHOLEST SERPL-MCNC: 211 MG/DL
CREAT SERPL-MCNC: 0.82 MG/DL (ref 0.6–1.1)
GFR SERPL CREATININE-BSD FRML MDRD: 76 ML/MIN/1.73M2
HDLC SERPL-MCNC: 59 MG/DL
LDLC SERPL CALC-MCNC: 109 MG/DL
PHOSPHATE SERPL-MCNC: 4.2 MG/DL (ref 2.5–4.5)
PTH-INTACT SERPL-MCNC: 56 PG/ML (ref 10–86)
TRIGL SERPL-MCNC: 214 MG/DL

## 2022-05-16 PROCEDURE — 84100 ASSAY OF PHOSPHORUS: CPT | Mod: ORL | Performed by: FAMILY MEDICINE

## 2022-05-16 PROCEDURE — 80061 LIPID PANEL: CPT | Mod: ORL | Performed by: FAMILY MEDICINE

## 2022-05-16 PROCEDURE — 82310 ASSAY OF CALCIUM: CPT | Mod: ORL | Performed by: FAMILY MEDICINE

## 2022-05-16 PROCEDURE — 82306 VITAMIN D 25 HYDROXY: CPT | Mod: ORL | Performed by: FAMILY MEDICINE

## 2022-05-17 LAB — DEPRECATED CALCIDIOL+CALCIFEROL SERPL-MC: 36 UG/L (ref 20–75)

## 2022-07-17 ENCOUNTER — HEALTH MAINTENANCE LETTER (OUTPATIENT)
Age: 72
End: 2022-07-17

## 2022-08-15 ENCOUNTER — HOSPITAL ENCOUNTER (OUTPATIENT)
Dept: BONE DENSITY | Facility: HOSPITAL | Age: 72
Discharge: HOME | End: 2022-08-15
Attending: FAMILY MEDICINE
Payer: MEDICARE

## 2022-08-15 DIAGNOSIS — M81.0 AGE-RELATED OSTEOPOROSIS WITHOUT CURRENT PATHOLOGICAL FRACTURE: ICD-10-CM

## 2022-08-15 PROCEDURE — 77081 DXA BONE DENSITY APPENDICULR: CPT

## 2022-08-15 PROCEDURE — 77080 DXA BONE DENSITY AXIAL: CPT

## 2022-09-25 ENCOUNTER — HEALTH MAINTENANCE LETTER (OUTPATIENT)
Age: 72
End: 2022-09-25

## 2023-01-01 NOTE — TELEPHONE ENCOUNTER
[facility] calling for a pull picc line order after the last dose or ertapenem today. I informed ok to remove PICC.  
I called the pt and informed the standard treatment for IV abx for a UTI is ten days. I informed the pt that she should monitor her sx and if sx should have another UC. The pt asked what if she has an infection again, and I informed that we would treat again.   
Patient called. She is going to be done w/IV meds on Friday and would like to find out if the infection is gone. She wants to know how Dr. Long would know if the infection is gone.     Please call patient @ [phone]  
2023 14:35

## 2023-08-06 ENCOUNTER — HEALTH MAINTENANCE LETTER (OUTPATIENT)
Age: 73
End: 2023-08-06

## 2023-08-22 ENCOUNTER — HOSPITAL ENCOUNTER (EMERGENCY)
Facility: HOSPITAL | Age: 73
Discharge: HOME | End: 2023-08-22
Admitting: PHYSICIAN ASSISTANT
Payer: MEDICARE

## 2023-08-22 VITALS
RESPIRATION RATE: 16 BRPM | SYSTOLIC BLOOD PRESSURE: 147 MMHG | DIASTOLIC BLOOD PRESSURE: 67 MMHG | HEART RATE: 81 BPM | TEMPERATURE: 98.3 F | OXYGEN SATURATION: 97 %

## 2023-08-22 DIAGNOSIS — S61.210A LACERATION OF RIGHT INDEX FINGER WITHOUT FOREIGN BODY WITHOUT DAMAGE TO NAIL, INITIAL ENCOUNTER: ICD-10-CM

## 2023-08-22 PROCEDURE — 90471 IMMUNIZATION ADMIN: CPT | Performed by: PHYSICIAN ASSISTANT

## 2023-08-22 PROCEDURE — 90715 TDAP VACCINE 7 YRS/> IM: CPT | Performed by: PHYSICIAN ASSISTANT

## 2023-08-22 PROCEDURE — 12001 RPR S/N/AX/GEN/TRNK 2.5CM/<: CPT

## 2023-08-22 PROCEDURE — 250N000011 HC RX IP 250 OP 636: Performed by: PHYSICIAN ASSISTANT

## 2023-08-22 PROCEDURE — 250N000009 HC RX 250: Performed by: STUDENT IN AN ORGANIZED HEALTH CARE EDUCATION/TRAINING PROGRAM

## 2023-08-22 PROCEDURE — 99283 EMERGENCY DEPT VISIT LOW MDM: CPT | Mod: 25

## 2023-08-22 RX ORDER — GINSENG 100 MG
CAPSULE ORAL ONCE
Status: COMPLETED | OUTPATIENT
Start: 2023-08-22 | End: 2023-08-22

## 2023-08-22 RX ADMIN — BACITRACIN: 500 OINTMENT TOPICAL at 23:40

## 2023-08-22 RX ADMIN — CLOSTRIDIUM TETANI TOXOID ANTIGEN (FORMALDEHYDE INACTIVATED), CORYNEBACTERIUM DIPHTHERIAE TOXOID ANTIGEN (FORMALDEHYDE INACTIVATED), BORDETELLA PERTUSSIS TOXOID ANTIGEN (GLUTARALDEHYDE INACTIVATED), BORDETELLA PERTUSSIS FILAMENTOUS HEMAGGLUTININ ANTIGEN (FORMALDEHYDE INACTIVATED), BORDETELLA PERTUSSIS PERTACTIN ANTIGEN, AND BORDETELLA PERTUSSIS FIMBRIAE 2/3 ANTIGEN 0.5 ML: 5; 2; 2.5; 5; 3; 5 INJECTION, SUSPENSION INTRAMUSCULAR at 22:43

## 2023-08-22 ASSESSMENT — ENCOUNTER SYMPTOMS: WOUND: 1

## 2023-08-22 ASSESSMENT — ACTIVITIES OF DAILY LIVING (ADL): ADLS_ACUITY_SCORE: 35

## 2023-08-23 NOTE — DISCHARGE INSTRUCTIONS
You were seen in the emergency department today for a finger laceration. The laceration was cleaned with saline and 3 sutures were placed. These are nonabsorbables sutures that will need to be removed in 10-14 days. Please monitor the wound for any signs of infection including increased pain, redness, swelling, puslike discharge return to the ER if this develops.    Your blood pressure was elevated in the emergencydepartment today and requires recheck and close follow-up in your primary care clinic. Untreated blood pressure can cause serious complications including, but not limited to stroke, heart attack/failure, and kidney disease.  Please make a close follow-up appointment to have this recheck performed. Please return to the emergency department immediately if you develop a severe headache, vision changes, chest pain, shortness of breath, orabdominal pain.

## 2023-08-23 NOTE — ED PROVIDER NOTES
Emergency Department Encounter   NAME: Rosmery Heard ; AGE: 73 year old female ; YOB: 1950 ; MRN: [id number] ; PCP: Pratibha Irene   ED PROVIDER: Tatyana Ash PA-C    Evaluation Date & Time:   8/22/2023  9:48 PM    CHIEF COMPLAINT:  Laceration      Impression and Plan   MDM:   Rosmery Heard is a 73 year old female with no pertinent history who presents to the ED by personal vehicle for evaluation of a laceration.  The patient presented to the emergency department for evaluation of a finger laceration that she sustained just prior to arrival in the emergency department using the bread cutter at [employer].  She has a 2 cm laceration to the distal finger pad of her right pointer digit that does have active oozing.  She is not anticoagulated.  Distal CMS is intact.  She has full range of motion at all joints with intact strength.  No concern for tendon rupture.  Updated her tetanus. No FB visualized in the wound. Bleeding subsided with direct pressure. Wound was anesthetized, cleaned, and repaired by Annita Dooley ED PA-C. Distal CMS unchanged following closure. We discussed wound care, monitoring for signs of infection, suture removal, and reasons to return to the ED. Patient verbalized understanding and is comfortable with the plan. Discharged in good condition. Plan to have BP rechecked in clinic.     Medical Decision Making    History:  Supplemental history from: Documented in chart, if applicable  External Record(s) reviewed: Documented in chart, if applicable.    Work Up:  Chart documentation includes differential considered and any EKGs or imaging independently interpreted by provider, where specified.  In additional to work up documented, I considered the following work up: Documented in chart, if applicable.    External consultation:  Discussion of management with another provider: Documented in chart, if applicable    Complicating factors:  Care impacted by chronic illness:  Cancer/Chemotherapy  Care affected by social determinants of health: N/A    Disposition considerations: Discharge. No recommendations on prescription strength medication(s). See documentation for any additional details.      ED COURSE:  9:58 PM Annita Dooley PA-C, met the patient.   10:22 PM  I met and introduced myself to the patient. I gathered initial history and performed my physical exam. We discussed plan for initial workup.   11:12 PM Annita performed laceration repair. I rechecked the patient and discussed results, discharge, follow up, and reasons to return to the ED.     At the conclusion of the encounter I discussed the results of all the tests and the disposition. The questions were answered. The patient or family acknowledged understanding and was agreeable with the care plan.    FINAL IMPRESSION:    ICD-10-CM    1. Laceration of right index finger without foreign body without damage to nail, initial encounter  S61.210A             MEDICATIONS GIVEN IN THE EMERGENCY DEPARTMENT:  Medications   bacitracin ointment (has no administration in time range)   Tdap (tetanus-diphtheria-acell pertussis) (ADACEL) injection 0.5 mL (0.5 mLs Intramuscular $Given 8/22/23 1706)         NEW PRESCRIPTIONS STARTED AT TODAY'S ED VISIT:  New Prescriptions    No medications on file         HPI   Patient information was obtained from: patient    Use of Intrepreter: N/A     Rosmery Heard is a 73 year old female with no pertinent history who presents to the ED by personal vehicle for evaluation of a laceration.     Patient reports she cut her right second finger ~9:15 PM using a bread slicer at [employer]. Denies use of blood thinners. Denies additional symptoms or complaints at this time.       REVIEW OF SYSTEMS:  Review of Systems   Skin:  Positive for wound (right second finger).         Medical History     Past Medical History:   Diagnosis Date    Cancer (H)     Kidney stone     UTI (urinary tract infection) 05/2021        Past Surgical History:   Procedure Laterality Date    BIOPSY VULVA N/A 12/20/2018    Procedure: EUA BIOPSY ANAL MASS;  Surgeon: Blaire Ashby MD;  Location: [facility];  Service: General    COMBINED CYSTOSCOPY, INSERT STENT URETER(S) Right 10/11/2019    Procedure: RIGHT CYSTOURETEROSCOPY WITH BASKET STONE EXTRACTION  AND URETERAL STENT INSERTION;  Surgeon: Aayush Rider MD;  Location: [facility] OR;  Service: Urology    COMBINED CYSTOSCOPY, INSERT STENT URETER(S) Left 11/12/2021    Procedure: CYSTOURETEROSCOPY, WITH LASER LITHOTRIPSY, CALCULUS REMOVAL AND URETERAL STENT INSERTION;  Surgeon: Aayush Rider MD;  Location: [facility]    DILATION AND CURETTAGE      PICC SINGLE LUMEN PLACEMENT  11/16/2021            Family History   Problem Relation Age of Onset    Urolithiasis Father     Heart Disease Father         mi    Diabetes Sister     Urolithiasis Cousin     Cancer No family hx of     Gout No family hx of     Clotting Disorder No family hx of        Social History     Tobacco Use    Smoking status: Never    Smokeless tobacco: Never   Substance Use Topics    Alcohol use: Yes     Comment: Alcoholic Drinks/day: very rare    Drug use: No       azelastine (ASTELIN) 137 mcg (0.1 %) nasal spray          Physical Exam     First Vitals:  Patient Vitals for the past 24 hrs:   BP Temp Temp src Pulse Resp SpO2   08/22/23 2143 (!) 147/67 98.3  F (36.8  C) Oral 81 16 97 %         PHYSICAL EXAM:   Physical Exam  Vitals and nursing note reviewed.   Constitutional:       General: She is not in acute distress.     Appearance: Normal appearance.   Musculoskeletal:      Comments: Curvilinear laceration to the distal fingerpad of the right second digit. Active oozing. No brisk or pulsatile bleeding. Full flexion and extension at all joints with intact strength.  Distal cap refill less than 2 seconds in fingers warm and well-perfused.  No foreign body visualized in the wound.   Neurological:       Mental Status: She is alert.             Results     LAB:  All pertinent labs reviewed and interpreted  Labs Ordered and Resulted from Time of ED Arrival to Time of ED Departure - No data to display    RADIOLOGY:  No orders to display     PROCEDURES:  PROCEDURE: Laceration Repair   INDICATIONS: Laceration   PROCEDURE PROVIDER: Nitza Dooley PA-C   SITE: Right pointer finger    TYPE/SIZE: simple, clean, and no foreign body visualized  2 cm (total length)   FUNCTIONAL ASSESSMENT: Distal sensation, circulation, motor, and tendon function intact   MEDICATION: See digital block note    PREPARATION: irrigation with Normal saline   DEBRIDEMENT: wound explored, no foreign body found   CLOSURE:  Superficial layer closed with 3 stitches of 5-0 Prolene simple interrupted    Total number of sutures/staples placed: 3       PROCEDURE: Digital Block   INDICATIONS: Finger laceration    PROCEDURE PROVIDER: Nitza Dooley PA-C   SITE: Right index finger (2nd digit)   MEDICATION: 3 mL of 1% Lidocaine without epinephrine   NOTE: The skin overlying the site for injection was prepped with chlorhexidine.  Needle was inserted in a standard three point injection pattern.  Each time the area was aspirated and there was no return of blood.  I then injected the medication at the base of the digit.  The patient had a good response to the procedure   COMPLICATIONS: Patient tolerated procedure well, without complication           I, Claribel Silverio, am serving as a scribe to document services personally performed by Tatyana Ash PA-C, based on my observation and the provider's statements to me. I, Tatyana Ash PA-C attest that Claribel Silverio is acting in a scribe capacity, has observed my performance of the services and has documented them in accordance with my direction.       Tatyana Ash PA-C   Emergency Medicine   [facility] EMERGENCY DEPARTMENT       Tatyana Ash PA-C  08/22/23 [id number]

## 2023-08-23 NOTE — ED TRIAGE NOTES
The patient cut her right second finger while using a bread slicer. The cut occurred at 2115. She is not on any blood thinners. The cut is currently wrapped in a bandage that is controlling the bleeding.      Triage Assessment       Row Name 08/22/23 2149       Triage Assessment (Adult)    Airway WDL WDL       Peripheral/Neurovascular WDL    Peripheral Neurovascular WDL WDL       Cognitive/Neuro/Behavioral WDL    Cognitive/Neuro/Behavioral WDL WDL

## 2024-09-28 ENCOUNTER — HEALTH MAINTENANCE LETTER (OUTPATIENT)
Age: 74
End: 2024-09-28

## 2025-04-07 ENCOUNTER — LAB REQUISITION (OUTPATIENT)
Dept: LAB | Facility: CLINIC | Age: 75
End: 2025-04-07
Payer: MEDICARE

## 2025-04-07 DIAGNOSIS — Z98.890 OTHER SPECIFIED POSTPROCEDURAL STATES: ICD-10-CM

## 2025-04-07 DIAGNOSIS — E55.9 VITAMIN D DEFICIENCY, UNSPECIFIED: ICD-10-CM

## 2025-04-07 PROCEDURE — 80048 BASIC METABOLIC PNL TOTAL CA: CPT | Mod: ORL | Performed by: FAMILY MEDICINE

## 2025-04-07 PROCEDURE — 82306 VITAMIN D 25 HYDROXY: CPT | Mod: ORL | Performed by: FAMILY MEDICINE

## 2025-04-07 PROCEDURE — 83970 ASSAY OF PARATHORMONE: CPT | Mod: ORL | Performed by: FAMILY MEDICINE

## 2025-04-08 LAB
ANION GAP SERPL CALCULATED.3IONS-SCNC: 11 MMOL/L (ref 7–15)
BUN SERPL-MCNC: 20.4 MG/DL (ref 8–23)
CALCIUM SERPL-MCNC: 9.6 MG/DL (ref 8.8–10.4)
CHLORIDE SERPL-SCNC: 105 MMOL/L (ref 98–107)
CREAT SERPL-MCNC: 0.81 MG/DL (ref 0.51–0.95)
EGFRCR SERPLBLD CKD-EPI 2021: 76 ML/MIN/1.73M2
GLUCOSE SERPL-MCNC: 105 MG/DL (ref 70–99)
HCO3 SERPL-SCNC: 25 MMOL/L (ref 22–29)
POTASSIUM SERPL-SCNC: 4.5 MMOL/L (ref 3.4–5.3)
PTH-INTACT SERPL-MCNC: 28 PG/ML (ref 15–65)
SODIUM SERPL-SCNC: 141 MMOL/L (ref 135–145)
VIT D+METAB SERPL-MCNC: 36 NG/ML (ref 20–50)